# Patient Record
Sex: MALE | Race: BLACK OR AFRICAN AMERICAN | Employment: OTHER | URBAN - METROPOLITAN AREA
[De-identification: names, ages, dates, MRNs, and addresses within clinical notes are randomized per-mention and may not be internally consistent; named-entity substitution may affect disease eponyms.]

---

## 2017-02-16 ENCOUNTER — HOSPITAL ENCOUNTER (EMERGENCY)
Age: 67
Discharge: HOME | End: 2017-02-16
Attending: EMERGENCY MEDICINE
Payer: MEDICARE

## 2017-02-16 VITALS
WEIGHT: 147 LBS | OXYGEN SATURATION: 95 % | SYSTOLIC BLOOD PRESSURE: 195 MMHG | HEIGHT: 69 IN | RESPIRATION RATE: 16 BRPM | HEART RATE: 76 BPM | TEMPERATURE: 98.3 F | DIASTOLIC BLOOD PRESSURE: 87 MMHG | BODY MASS INDEX: 21.77 KG/M2

## 2017-02-16 DIAGNOSIS — I10 UNCONTROLLED HYPERTENSION: Primary | ICD-10-CM

## 2017-02-16 LAB
ANION GAP BLD CALC-SCNC: 10 MMOL/L (ref 7–16)
BUN SERPL-MCNC: 11 MG/DL (ref 8–23)
CALCIUM SERPL-MCNC: 9.2 MG/DL (ref 8.3–10.4)
CHLORIDE SERPL-SCNC: 104 MMOL/L (ref 98–107)
CO2 SERPL-SCNC: 30 MMOL/L (ref 21–32)
CREAT SERPL-MCNC: 1.81 MG/DL (ref 0.8–1.5)
GLUCOSE SERPL-MCNC: 83 MG/DL (ref 65–100)
POTASSIUM SERPL-SCNC: 3.8 MMOL/L (ref 3.5–5.1)
SODIUM SERPL-SCNC: 144 MMOL/L (ref 136–145)

## 2017-02-16 PROCEDURE — 96375 TX/PRO/DX INJ NEW DRUG ADDON: CPT | Performed by: EMERGENCY MEDICINE

## 2017-02-16 PROCEDURE — 74011250636 HC RX REV CODE- 250/636: Performed by: EMERGENCY MEDICINE

## 2017-02-16 PROCEDURE — 74011250637 HC RX REV CODE- 250/637: Performed by: EMERGENCY MEDICINE

## 2017-02-16 PROCEDURE — 99284 EMERGENCY DEPT VISIT MOD MDM: CPT | Performed by: EMERGENCY MEDICINE

## 2017-02-16 PROCEDURE — 74011000250 HC RX REV CODE- 250: Performed by: EMERGENCY MEDICINE

## 2017-02-16 PROCEDURE — 80048 BASIC METABOLIC PNL TOTAL CA: CPT | Performed by: EMERGENCY MEDICINE

## 2017-02-16 PROCEDURE — 96374 THER/PROPH/DIAG INJ IV PUSH: CPT | Performed by: EMERGENCY MEDICINE

## 2017-02-16 RX ORDER — HYDROCHLOROTHIAZIDE 25 MG/1
25 TABLET ORAL DAILY
Qty: 30 TAB | Refills: 0 | Status: SHIPPED | OUTPATIENT
Start: 2017-02-16 | End: 2017-02-22 | Stop reason: SDUPTHER

## 2017-02-16 RX ORDER — AMLODIPINE BESYLATE 10 MG/1
10 TABLET ORAL DAILY
Qty: 30 TAB | Refills: 0 | Status: SHIPPED | OUTPATIENT
Start: 2017-02-16 | End: 2017-02-22 | Stop reason: SDUPTHER

## 2017-02-16 RX ORDER — AMLODIPINE BESYLATE 10 MG/1
10 TABLET ORAL
Status: COMPLETED | OUTPATIENT
Start: 2017-02-16 | End: 2017-02-16

## 2017-02-16 RX ORDER — LABETALOL HYDROCHLORIDE 5 MG/ML
20 INJECTION, SOLUTION INTRAVENOUS ONCE
Status: COMPLETED | OUTPATIENT
Start: 2017-02-16 | End: 2017-02-16

## 2017-02-16 RX ORDER — HYDRALAZINE HYDROCHLORIDE 20 MG/ML
20 INJECTION INTRAMUSCULAR; INTRAVENOUS
Status: COMPLETED | OUTPATIENT
Start: 2017-02-16 | End: 2017-02-16

## 2017-02-16 RX ORDER — CARVEDILOL 25 MG/1
25 TABLET ORAL
Status: COMPLETED | OUTPATIENT
Start: 2017-02-16 | End: 2017-02-16

## 2017-02-16 RX ORDER — HYDROCHLOROTHIAZIDE 25 MG/1
25 TABLET ORAL
Status: COMPLETED | OUTPATIENT
Start: 2017-02-16 | End: 2017-02-16

## 2017-02-16 RX ORDER — SODIUM CHLORIDE 0.9 % (FLUSH) 0.9 %
5-10 SYRINGE (ML) INJECTION AS NEEDED
Status: DISCONTINUED | OUTPATIENT
Start: 2017-02-16 | End: 2017-02-16 | Stop reason: HOSPADM

## 2017-02-16 RX ORDER — SODIUM CHLORIDE 0.9 % (FLUSH) 0.9 %
5-10 SYRINGE (ML) INJECTION EVERY 8 HOURS
Status: DISCONTINUED | OUTPATIENT
Start: 2017-02-16 | End: 2017-02-16 | Stop reason: HOSPADM

## 2017-02-16 RX ORDER — CARVEDILOL 25 MG/1
25 TABLET ORAL 2 TIMES DAILY WITH MEALS
Qty: 60 TAB | Refills: 0 | Status: SHIPPED | OUTPATIENT
Start: 2017-02-16 | End: 2017-02-22 | Stop reason: SDUPTHER

## 2017-02-16 RX ADMIN — CARVEDILOL 25 MG: 25 TABLET, FILM COATED ORAL at 15:48

## 2017-02-16 RX ADMIN — HYDRALAZINE HYDROCHLORIDE 20 MG: 20 INJECTION INTRAMUSCULAR; INTRAVENOUS at 14:17

## 2017-02-16 RX ADMIN — AMLODIPINE BESYLATE 10 MG: 10 TABLET ORAL at 15:08

## 2017-02-16 RX ADMIN — LABETALOL HYDROCHLORIDE 20 MG: 5 INJECTION, SOLUTION INTRAVENOUS at 16:42

## 2017-02-16 RX ADMIN — HYDROCHLOROTHIAZIDE 25 MG: 25 TABLET ORAL at 15:48

## 2017-02-16 NOTE — ED TRIAGE NOTES
Patient out of BP meds since November. Sent over from [address]. Patient with no complaints.

## 2017-02-16 NOTE — PROGRESS NOTES
Patient requesting assistance with PCP. Made patient an appt with Weston Vera NP at [facility] for Wednesday, February 22nd at 10am / patient to arrive at 9:30am.    Patient and MD aware of appt. Pamphlet with contact information for [facility] given to patient. Patient verbalized understanding of all information.

## 2017-02-16 NOTE — DISCHARGE INSTRUCTIONS
Learning About High Blood Pressure  What is high blood pressure? Blood pressure is a measure of how hard the blood pushes against the walls of your arteries. It's normal for blood pressure to go up and down throughout the day, but if it stays up, you have high blood pressure. Another name for high blood pressure is hypertension. Two numbers tell you your blood pressure. The first number is the systolic pressure. It shows how hard the blood pushes when your heart is pumping. The second number is the diastolic pressure. It shows how hard the blood pushes between heartbeats, when your heart is relaxed and filling with blood. A blood pressure of less than 120/80 (say \"120 over 80\") is ideal for an adult. High blood pressure is 140/90 or higher. You have high blood pressure if your top number is 140 or higher or your bottom number is 90 or higher, or both. Many people fall into the category in between, called prehypertension. People with prehypertension need to make lifestyle changes to bring their blood pressure down and help prevent or delay high blood pressure. What happens when you have high blood pressure? · Blood flows through your arteries with too much force. Over time, this damages the walls of your arteries. But you can't feel it. High blood pressure usually doesn't cause symptoms. · Fat and calcium start to build up in your arteries. This buildup is called plaque. Plaque makes your arteries narrower and stiffer. Blood can't flow through them as easily. · This lack of good blood flow starts to damage some of the organs in your body. This can lead to problems such as coronary artery disease and heart attack, heart failure, stroke, kidney failure, and eye damage. How can you prevent high blood pressure? · Stay at a healthy weight. · Try to limit how much sodium you eat to less than 2,300 milligrams (mg) a day.  If you limit your sodium to 1,500 mg a day, you can lower your blood pressure even more.  ¨ Buy foods that are labeled \"unsalted,\" \"sodium-free,\" or \"low-sodium. \" Foods labeled \"reduced-sodium\" and \"light sodium\" may still have too much sodium. ¨ Flavor your food with garlic, lemon juice, onion, vinegar, herbs, and spices instead of salt. Do not use soy sauce, steak sauce, onion salt, garlic salt, mustard, or ketchup on your food. ¨ Use less salt (or none) when recipes call for it. You can often use half the salt a recipe calls for without losing flavor. · Be physically active. Get at least 30 minutes of exercise on most days of the week. Walking is a good choice. You also may want to do other activities, such as running, swimming, cycling, or playing tennis or team sports. · Limit alcohol to 2 drinks a day for men and 1 drink a day for women. · Eat plenty of fruits, vegetables, and low-fat dairy products. Eat less saturated and total fats. How is high blood pressure treated? · Your doctor will suggest making lifestyle changes. For example, your doctor may ask you to eat healthy foods, quit smoking, lose extra weight, and be more active. · If lifestyle changes don't help enough or your blood pressure is very high, you will have to take medicine every day. Follow-up care is a key part of your treatment and safety. Be sure to make and go to all appointments, and call your doctor if you are having problems. It's also a good idea to know your test results and keep a list of the medicines you take. Where can you learn more? Go to http://teresa-abi.info/. Enter P501 in the search box to learn more about \"Learning About High Blood Pressure. \"  Current as of: March 23, 2016  Content Version: 11.1  © 4929-8597 [employer]. Care instructions adapted under license by [employer] (which disclaims liability or warranty for this information).  If you have questions about a medical condition or this instruction, always ask your healthcare professional. [employer] disclaims any warranty or liability for your use of this information.

## 2017-02-16 NOTE — ED PROVIDER NOTES
HPI Comments: Patient was referred to the return from ultrasound due to uncontrolled hypertension. The patient however is asymptomatic. He was also seen today earlier at vascular surgery office by Dr. Hernando Graham. He is recently changed insurance and has to change healthcare providers and due to the lack of primary care provider his prescriptions for his antihypertensive medications have run out. He states he's been out of his blood pressure medicine for about 2 months. Again he denies any symptoms such as headache vision changes chest pain or shortness of breath. Patient is a 73 y.o. male presenting with hypertension. The history is provided by the patient. Hypertension    This is a chronic problem. The problem has been gradually worsening. Pertinent negatives include no chest pain, no orthopnea, no palpitations, no PND, no anxiety, no confusion, no malaise/fatigue, no blurred vision, no headaches, no tinnitus, no neck pain, no peripheral edema, no dizziness, no shortness of breath, no nausea and no vomiting. There are no associated agents to hypertension. Past Medical History:   Diagnosis Date    Chronic kidney disease     CKD (chronic kidney disease)     Hyperlipidemia     Hypertension     PAD (peripheral artery disease) (HCC)     Renal artery stenosis (HCC)     Spinal stenosis     Stroke [facility])        Past Surgical History:   Procedure Laterality Date    Vascular surgery procedure unlist       stents to L femoral, aorto-femoral bypass         Family History:   Problem Relation Age of Onset    Stroke Father     Hypertension Father     Stroke Mother     Hypertension Mother     Cancer Brother      pancreatic       Social History     Social History    Marital status: SINGLE     Spouse name: N/A    Number of children: N/A    Years of education: N/A     Occupational History    Not on file.      Social History Main Topics    Smoking status: Current Every Day Smoker     Packs/day: 0.50    Smokeless tobacco: Never Used    Alcohol use No    Drug use: No    Sexual activity: Not on file     Other Topics Concern    Not on file     Social History Narrative         ALLERGIES: Review of patient's allergies indicates no known allergies. Review of Systems   Constitutional: Negative for malaise/fatigue. HENT: Negative for tinnitus. Eyes: Negative for blurred vision. Respiratory: Negative for shortness of breath. Cardiovascular: Negative for chest pain, palpitations, orthopnea and PND. Gastrointestinal: Negative for nausea and vomiting. Musculoskeletal: Negative for neck pain. Neurological: Negative for dizziness and headaches. Psychiatric/Behavioral: Negative for confusion. All other systems reviewed and are negative. Vitals:    02/16/17 1304   BP: (!) 244/108   Pulse: 66   Resp: 18   Temp: 98 °F (36.7 °C)   SpO2: 99%   Weight: 66.7 kg (147 lb)   Height: 5' 9\" (1.753 m)            Physical Exam   Constitutional: He is oriented to person, place, and time. He appears well-developed and well-nourished. No distress. HENT:   Head: Normocephalic and atraumatic. Mouth/Throat: No oropharyngeal exudate. Eyes: Conjunctivae and EOM are normal. Pupils are equal, round, and reactive to light. Neck: Normal range of motion. Neck supple. Cardiovascular: Normal rate, regular rhythm, normal heart sounds and intact distal pulses. Pulmonary/Chest: Effort normal and breath sounds normal.   Abdominal: Soft. Bowel sounds are normal.   Musculoskeletal: Normal range of motion. He exhibits no edema, tenderness or deformity. Neurological: He is alert and oriented to person, place, and time. Skin: Skin is warm and dry. Psychiatric: He has a normal mood and affect. His behavior is normal.   Nursing note and vitals reviewed. MDM  Number of Diagnoses or Management Options  Diagnosis management comments:  We will check patient's renal function with a BMP and place a saline lock and treat his hypertension with plans for discharge to arrange primary care follow-up and prescriptions for hypertension medications       Amount and/or Complexity of Data Reviewed  Clinical lab tests: ordered and reviewed    Risk of Complications, Morbidity, and/or Mortality  Presenting problems: moderate  Diagnostic procedures: moderate  Management options: moderate    Patient Progress  Patient progress: stable    ED Course       Procedures

## 2017-02-22 PROBLEM — I73.9 PAD (PERIPHERAL ARTERY DISEASE) (HCC): Status: ACTIVE | Noted: 2017-02-22

## 2017-02-22 PROBLEM — I70.1 RENAL ARTERY STENOSIS (HCC): Status: ACTIVE | Noted: 2017-02-22

## 2017-02-22 PROBLEM — N18.30 CKD (CHRONIC KIDNEY DISEASE) STAGE 3, GFR 30-59 ML/MIN (HCC): Status: ACTIVE | Noted: 2017-02-22

## 2017-02-22 PROBLEM — E78.5 HYPERLIPIDEMIA: Status: ACTIVE | Noted: 2017-02-22

## 2017-02-22 PROBLEM — Z72.0 TOBACCO ABUSE: Status: ACTIVE | Noted: 2017-02-22

## 2017-03-10 ENCOUNTER — HOSPITAL ENCOUNTER (OUTPATIENT)
Dept: GENERAL RADIOLOGY | Age: 67
Discharge: HOME | End: 2017-03-10
Payer: MEDICARE

## 2017-03-10 DIAGNOSIS — M25.552 PAIN OF LEFT HIP JOINT: ICD-10-CM

## 2017-03-10 DIAGNOSIS — M25.562 ACUTE PAIN OF LEFT KNEE: ICD-10-CM

## 2017-03-10 PROBLEM — N18.9 CHRONIC UREMIA: Status: ACTIVE | Noted: 2017-03-10

## 2017-03-10 PROBLEM — I10 HYPERTENSION: Status: ACTIVE | Noted: 2017-03-10

## 2017-03-10 PROBLEM — J30.9 ATOPIC RHINITIS: Status: ACTIVE | Noted: 2017-03-10

## 2017-03-10 PROBLEM — N18.9 CHRONIC UREMIA: Status: RESOLVED | Noted: 2017-03-10 | Resolved: 2017-03-10

## 2017-03-10 PROBLEM — M48.00 SPINAL STENOSIS: Status: ACTIVE | Noted: 2017-03-10

## 2017-03-10 PROCEDURE — 73560 X-RAY EXAM OF KNEE 1 OR 2: CPT

## 2017-03-10 PROCEDURE — 73502 X-RAY EXAM HIP UNI 2-3 VIEWS: CPT

## 2017-03-24 PROBLEM — N52.01 ERECTILE DYSFUNCTION DUE TO ARTERIAL INSUFFICIENCY: Status: ACTIVE | Noted: 2017-03-24

## 2017-04-21 PROBLEM — M25.562 LEFT KNEE PAIN: Status: ACTIVE | Noted: 2017-04-21

## 2017-04-21 PROBLEM — I25.10 CORONARY ARTERY DISEASE INVOLVING NATIVE CORONARY ARTERY OF NATIVE HEART WITHOUT ANGINA PECTORIS: Status: ACTIVE | Noted: 2017-04-21

## 2017-06-18 ENCOUNTER — HOSPITAL ENCOUNTER (EMERGENCY)
Age: 67
Discharge: HOME | DRG: 253 | End: 2017-06-18
Attending: EMERGENCY MEDICINE
Payer: MEDICARE

## 2017-06-18 ENCOUNTER — APPOINTMENT (OUTPATIENT)
Dept: CT IMAGING | Age: 67
DRG: 253 | End: 2017-06-18
Attending: EMERGENCY MEDICINE
Payer: MEDICARE

## 2017-06-18 VITALS
HEIGHT: 69 IN | RESPIRATION RATE: 18 BRPM | WEIGHT: 137 LBS | SYSTOLIC BLOOD PRESSURE: 195 MMHG | BODY MASS INDEX: 20.29 KG/M2 | HEART RATE: 67 BPM | DIASTOLIC BLOOD PRESSURE: 93 MMHG | OXYGEN SATURATION: 96 % | TEMPERATURE: 97.9 F

## 2017-06-18 DIAGNOSIS — I73.9 PERIPHERAL ARTERIAL DISEASE (HCC): Primary | ICD-10-CM

## 2017-06-18 DIAGNOSIS — M54.31 SCIATICA OF RIGHT SIDE: ICD-10-CM

## 2017-06-18 LAB
ANION GAP BLD CALC-SCNC: 7 MMOL/L (ref 7–16)
BASOPHILS # BLD AUTO: 0.1 K/UL (ref 0–0.2)
BASOPHILS # BLD: 1 % (ref 0–2)
BUN SERPL-MCNC: 10 MG/DL (ref 8–23)
CALCIUM SERPL-MCNC: 8.6 MG/DL (ref 8.3–10.4)
CHLORIDE SERPL-SCNC: 103 MMOL/L (ref 98–107)
CO2 SERPL-SCNC: 31 MMOL/L (ref 21–32)
CREAT SERPL-MCNC: 1.54 MG/DL (ref 0.8–1.5)
DIFFERENTIAL METHOD BLD: ABNORMAL
EOSINOPHIL # BLD: 0.3 K/UL (ref 0–0.8)
EOSINOPHIL NFR BLD: 4 % (ref 0.5–7.8)
ERYTHROCYTE [DISTWIDTH] IN BLOOD BY AUTOMATED COUNT: 13.8 % (ref 11.9–14.6)
GLUCOSE SERPL-MCNC: 88 MG/DL (ref 65–100)
HCT VFR BLD AUTO: 44.9 % (ref 41.1–50.3)
HGB BLD-MCNC: 15.1 G/DL (ref 13.6–17.2)
IMM GRANULOCYTES # BLD: 0 K/UL (ref 0–0.5)
IMM GRANULOCYTES NFR BLD AUTO: 0.2 % (ref 0–5)
INR PPP: 1 (ref 0.9–1.2)
LYMPHOCYTES # BLD AUTO: 22 % (ref 13–44)
LYMPHOCYTES # BLD: 1.5 K/UL (ref 0.5–4.6)
MAGNESIUM SERPL-MCNC: 2.1 MG/DL (ref 1.8–2.4)
MCH RBC QN AUTO: 30.3 PG (ref 26.1–32.9)
MCHC RBC AUTO-ENTMCNC: 33.6 G/DL (ref 31.4–35)
MCV RBC AUTO: 90 FL (ref 79.6–97.8)
MONOCYTES # BLD: 0.7 K/UL (ref 0.1–1.3)
MONOCYTES NFR BLD AUTO: 11 % (ref 4–12)
NEUTS SEG # BLD: 4.1 K/UL (ref 1.7–8.2)
NEUTS SEG NFR BLD AUTO: 62 % (ref 43–78)
PLATELET # BLD AUTO: 222 K/UL (ref 150–450)
PMV BLD AUTO: 9.9 FL (ref 10.8–14.1)
POTASSIUM SERPL-SCNC: 3.5 MMOL/L (ref 3.5–5.1)
PROTHROMBIN TIME: 10.9 SEC (ref 9.6–12)
RBC # BLD AUTO: 4.99 M/UL (ref 4.23–5.67)
SODIUM SERPL-SCNC: 141 MMOL/L (ref 136–145)
WBC # BLD AUTO: 6.7 K/UL (ref 4.3–11.1)

## 2017-06-18 RX ORDER — HYDROCODONE BITARTRATE AND ACETAMINOPHEN 5; 325 MG/1; MG/1
1-2 TABLET ORAL
Qty: 30 TAB | Refills: 0 | Status: SHIPPED | OUTPATIENT
Start: 2017-06-18 | End: 2017-07-25

## 2017-06-18 RX ORDER — SODIUM CHLORIDE 0.9 % (FLUSH) 0.9 %
10 SYRINGE (ML) INJECTION
Status: COMPLETED | OUTPATIENT
Start: 2017-06-18 | End: 2017-06-18

## 2017-06-18 RX ADMIN — SODIUM CHLORIDE 1000 ML: 900 INJECTION, SOLUTION INTRAVENOUS at 10:13

## 2017-06-18 RX ADMIN — SODIUM CHLORIDE 100 ML: 900 INJECTION, SOLUTION INTRAVENOUS at 11:30

## 2017-06-18 RX ADMIN — IOPAMIDOL 125 ML: 755 INJECTION, SOLUTION INTRAVENOUS at 11:30

## 2017-06-18 RX ADMIN — Medication 10 ML: at 11:30

## 2017-06-18 NOTE — CONSULTS
[address] FAX: [phone]    Laci Suero  1950    Chief Complaint   Patient presents with    Back Pain           HPI   Mr. Laci Suero is a 78y.o. year old male who presents to the emergency department with  right lower hip and leg pain over the last week. Patient has a history of an aortobifem procedure done in [place] 20 years ago. Patient has history of tobacco abuse and hypertension. Patient said he presented to [facility] [place] with similar symptoms last year, left leg pain in which they did embolectomy of his occluded left limb. Current Outpatient Prescriptions   Medication Sig Dispense Refill    HYDROcodone-acetaminophen (NORCO) 5-325 mg per tablet Take 1-2 Tabs by mouth every eight (8) hours as needed for Pain. Max Daily Amount: 6 Tabs. 30 Tab 0    traMADol (ULTRAM) 50 mg tablet 1 tab p.o. twice daily as needed pain. Indications: Pain 40 Tab 0    sildenafil citrate (VIAGRA) 50 mg tablet Take 1 Tab by mouth as needed. Indications: Erectile Dysfunction 6 Tab 2    atorvastatin (LIPITOR) 20 mg tablet Take 1 Tab by mouth daily. 30 Tab 5    hydrALAZINE (APRESOLINE) 10 mg tablet Take 1 Tab by mouth three (3) times daily. 90 Tab 5    amLODIPine (NORVASC) 10 mg tablet Take 1 Tab by mouth daily. 30 Tab 5    carvedilol (COREG) 25 mg tablet Take 1 Tab by mouth two (2) times daily (with meals). 60 Tab 5    hydroCHLOROthiazide (HYDRODIURIL) 25 mg tablet Take 1 Tab by mouth daily. 30 Tab 5    aspirin delayed-release 81 mg tablet Take 81 mg by mouth daily.        No Known Allergies  Past Medical History:   Diagnosis Date    Chronic kidney disease     CKD (chronic kidney disease)     Hyperlipidemia     Hypertension     PAD (peripheral artery disease) (HCC)     Renal artery stenosis (HCC)     Spinal stenosis     Stroke ([facility] [address]      Family History   Problem Relation Age of Onset    Stroke Father    William Mina Hypertension Father     Stroke Mother     Hypertension Mother     Cancer Brother      pancreatic     Past Surgical History:   Procedure Laterality Date    VASCULAR SURGERY PROCEDURE UNLIST      stents to L femoral, aorto-femoral bypass     Social History   Substance Use Topics    Smoking status: Current Some Day Smoker     Packs/day: 0.25     Types: Cigarettes    Smokeless tobacco: Never Used    Alcohol use No        Review of Systems  Constitutional: Negative for fever and chills. HENT: Negative for congestion and sore throat. Skin: Negative for rash and itching. Eyes: Negative for blurred vision and double vision. Respiratory: Negative for cough and shortness of breath. Cardiovascular: Negative for chest pain, palpitations, claudication and leg swelling. Gastrointestinal: Negative for nausea, vomiting and abdominal pain. Genitourinary: Negative for dysuria, hematuria and flank pain. Musculoskeletal: Negative for joint pain and falls. Neurological: Negative for dizziness, sensory change, focal weakness, loss of consciousness and headaches. PHYSICAL EXAM     Vitals:    06/18/17 1211 06/18/17 1216 06/18/17 1223 06/18/17 1229   BP:       BP 1 Location:       BP Patient Position:       Pulse: 72 73 67    Resp:       Temp:       SpO2: 97% 97% 95% 96%   Weight:       Height:            Constitutional: he appears well-developed. No distress. HENT: Hearing intact. Head: Atraumatic. Eyes: Pupils are equal, round, and reactive to light. Neck: Normal range of motion. Cardiovascular: Regular rhythm. Pulmonary/Chest: Effort normal and breath sounds normal. No respiratory distress. Abdominal: Soft. Bowel sounds are normal. he exhibits no distension. There is no tenderness. There is no guarding. No hernia. Musculoskeletal: Normal range of motion. Neurological: He is alert. CN II- XII grossly intact  Skin: Warm and dry.   Vascular: No palpable femoral pulse, palpable left femoral pulse, lower extremity warm motor and sensory intact      Imaging Results  I reviewed CTA images which showed an occluded right aortofemoral limb, reconstitutes of the profunda and SFA artery with diffuse tibial disease on the right, left limb patent with an occluded left SFA with profunda patent on the left    ASSESSMENT AND PLAN   Mr. Shawn Jacobsen is a 78y.o. 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 Patient clinically is not ischemic with motor and sensory intact. Would recommend admitting patient to the hospital with IV heparin and plan for embolectomy possible femorofemoral bypass. Patient preference is to go home and to schedule electively this week. I would rather admit the patient however patient is not ischemic. Have electively schedule patient for surgery on Tuesday he will be admitted to the hospital tomorrow for further workup and placed on heparin and heparin drip until surgery on on Tuesday. I continue to stress the importance of tobacco abuse to his already peripheral vascular disease. Elements of this note have been dictated using speech recognition software. As a result, errors of speech recognition may have occurred.

## 2017-06-18 NOTE — ED TRIAGE NOTES
Pt reports right sided lower back pain that goes down his right leg into his foot that started a week ago. Pt states he just took his blood pressure meds before he came in this morning.

## 2017-06-18 NOTE — ED PROVIDER NOTES
HPI Comments: Patient with history of peripheral vascular disease and aorta left femoral bypass. Presents with 1 week of low back pain radiating down the right leg. Pain is present regardless of whether he is sitting or standing. Pain is worse with walking exertion/better with rest, remaining still. No history of sciatica, patient states that the pain down his right leg is reminiscent of when he wound up getting the bypass done to the left leg. Patient relates the time of the bypass which was some years ago in [place] the right femoral pulse was bounding but they couldn't feel it on the left. Patient states he recently he can't feel the pounding over the right femoral pulse. Patient is a 78 y.o. male presenting with back pain. The history is provided by the patient. Back Pain    This is a new problem. The current episode started more than 1 week ago. The problem has not changed since onset. The problem occurs constantly. Patient reports not work related injury. The pain is associated with no known injury. Pertinent negatives include no chest pain, no fever, no headaches and no abdominal pain. Past Medical History:   Diagnosis Date    Chronic kidney disease     CKD (chronic kidney disease)     Hyperlipidemia     Hypertension     PAD (peripheral artery disease) (HCC)     Renal artery stenosis (HCC)     Spinal stenosis     Stroke [facility])        Past Surgical History:   Procedure Laterality Date    VASCULAR SURGERY PROCEDURE UNLIST      stents to L femoral, aorto-femoral bypass         Family History:   Problem Relation Age of Onset    Stroke Father     Hypertension Father     Stroke Mother     Hypertension Mother     Cancer Brother      pancreatic       Social History     Social History    Marital status: SINGLE     Spouse name: N/A    Number of children: N/A    Years of education: N/A     Occupational History    Not on file.      Social History Main Topics    Smoking status: Current Some Day Smoker     Packs/day: 0.25     Types: Cigarettes    Smokeless tobacco: Never Used    Alcohol use No    Drug use: No    Sexual activity: Yes     Partners: Female     Birth control/ protection: Condom     Other Topics Concern    Not on file     Social History Narrative         ALLERGIES: Review of patient's allergies indicates no known allergies. Review of Systems   Constitutional: Negative for chills and fever. HENT: Negative for rhinorrhea and sore throat. Eyes: Negative for discharge and redness. Respiratory: Negative for cough and shortness of breath. Cardiovascular: Negative for chest pain and palpitations. Gastrointestinal: Negative for abdominal pain, nausea and vomiting. Musculoskeletal: Positive for back pain, gait problem and myalgias. Negative for arthralgias. Skin: Negative for rash. Neurological: Negative for dizziness and headaches. All other systems reviewed and are negative. Vitals:    06/18/17 0652 06/18/17 0933 06/18/17 0943 06/18/17 0950   BP: (!) 202/102 184/84     Pulse: 76 72 69 67   Resp: 18      Temp: 97.8 °F (36.6 °C)      SpO2: 100% 98% 97% 97%   Weight: 62.1 kg (137 lb)      Height: 5' 9\" (1.753 m)               Physical Exam   Constitutional: He is oriented to person, place, and time. He appears well-developed and well-nourished. HENT:   Head: Normocephalic and atraumatic. Eyes: Conjunctivae are normal. Pupils are equal, round, and reactive to light. Right eye exhibits no discharge. Left eye exhibits no discharge. No scleral icterus. Neck: Normal range of motion. Neck supple. Cardiovascular: Normal rate, regular rhythm and normal heart sounds. Exam reveals no gallop. No murmur heard. Pulses:       Dorsalis pedis pulses are 0 on the right side, and 2+ on the left side. Posterior tibial pulses are 0 on the right side, and 2+ on the left side.    Right dorsalis pedis pulse is not even detectable by Doppler,    Right posterior tibial pulse is barely detectable by Doppler and has a smoother flatter sine wave consistency  Rather than a sharp, brisk arterial signal.    Unable to obtain a blood pressure even by manual cuff on the right lower extremity to permit SITA comparisons   Pulmonary/Chest: Effort normal and breath sounds normal. No respiratory distress. He has no wheezes. He has no rales. Abdominal: Soft. Bowel sounds are normal. There is no tenderness. There is no guarding. Musculoskeletal: Normal range of motion. He exhibits no edema. Neurological: He is alert and oriented to person, place, and time. He exhibits normal muscle tone. cni 2-12 grossly   Skin: Skin is warm and dry. He is not diaphoretic. Psychiatric: He has a normal mood and affect. His behavior is normal.   Nursing note and vitals reviewed. MDM  Number of Diagnoses or Management Options  Peripheral arterial disease ([address]):   Sciatica of right side:   Diagnosis management comments: Medical decision making note:  Right leg pain with intermittent claudication, status post aortofemoral bypass on the left side roughly September 2016  Now presents with right leg pain and CT scan reveals Iliac occlusion on the right side  Discussed with Dr. Rashawn Hill from vascular surgery who will come and evaluate the patient after reviewing the CT scan. Patient seen by Dr. Rashawn Hill in ER based on exam, history, CT scan he feels this iliac occlusion is chronic in nature. Admittedly the right foot is just is warm as the left, he does have a weakly dopplerable posterior tibial pulse on that side. Ora Rojas recommended admission today and surgery in a couple days, patient wanted to go home instead. Dr. Deboraha Halsted spoke to patient and will see him in the office to make arrangements for surgery soon. This concludes the \"medical decision making note\" part of this emergency department visit note.       ED Course       Procedures

## 2017-06-18 NOTE — DISCHARGE INSTRUCTIONS
Quit smoking         Learning About Aortobifemoral Bypass Surgery for Peripheral Arterial Disease  What is an aortobifemoral bypass? An aortobifemoral (say \"ev-EI-ulu-by-FEM--denise\") bypass is surgery to redirect blood flow around blocked blood vessels in your belly or pelvis. These blocked blood vessels are caused by peripheral arterial disease. The surgery is done to get more blood flow to the legs. This may allow you to walk farther. Your doctor will use a man-made blood vessel, called a graft, to bypass the blocked blood vessels. The graft will carry blood from the aorta to the femoral artery in the groin area of each thigh. The aorta is the large blood vessel that carries blood from the heart to the blood vessels in the belly. The femoral arteries are large blood vessels that carry blood from the blood vessels in the belly and pelvis to the legs. How is the surgery done? You will be asleep during the surgery. The doctor will make cuts (incisions) in your belly and in the groin area of each thigh. The doctor will connect the graft to the aorta through the cut in the belly. He or she will then tunnel the graft down to the cuts in your groin. This connects the bypass to your femoral arteries. When the graft is in place, the doctor will close the cuts in your skin with stitches or staples. What can you expect after this surgery? You will probably spend 4 to 7 days in the hospital. Your belly and groin will be sore for several weeks. You will probably feel more tired than usual for several weeks. You may be able to do many of your usual activities after 4 to 6 weeks. But you will likely need 2 to 3 months to fully recover. You will probably need to take at least 4 to 6 weeks off from work. It depends on the type of work you do and how you feel. Follow-up care is a key part of your treatment and safety. Be sure to make and go to all appointments, and call your doctor if you are having problems.  It's also a good idea to know your test results and keep a list of the medicines you take. Where can you learn more? Go to http://teresa-abi.info/. Enter 24 [id number] in the search box to learn more about \"Learning About Aortobifemoral Bypass Surgery for Peripheral Arterial Disease. \"  Current as of: January 27, 2016  Content Version: 11.2  © 8707-6483 "[employer]. Care instructions adapted under license by [employer] (which disclaims liability or warranty for this information). If you have questions about a medical condition or this instruction, always ask your healthcare professional. [place] [address] any warranty or liability for your use of this information. Learning About How to Have a Healthy Back  What causes back pain? Back pain is often caused by overuse, strain, or injury. For example, people often hurt their backs playing sports or working in the yard, being jolted in a car accident, or lifting something too heavy. Aging plays a part too. Your bones and muscles tend to lose strength as you age, which makes injury more likely. The spongy discs between the bones of the spine (vertebrae) may suffer from wear and tear and no longer provide enough cushion between the bones. A disc that bulges or breaks open (herniated disc) can press on nerves, causing back pain. In some people, back pain is the result of arthritis, broken vertebrae caused by bone loss (osteoporosis), illness, or a spine problem. Although most people have back pain at one time or another, there are steps you can take to make it less likely. How can you have a healthy back? Reduce stress on your back through good posture  Slumping or slouching alone may not cause low back pain. But after the back has been strained or injured, bad posture can make pain worse. · Sleep in a position that maintains your back's normal curves and on a mattress that feels comfortable.  Sleep on your side with a pillow between your knees, or sleep on your back with a pillow under your knees. These positions can reduce strain on your back. · Stand and sit up straight. \"Good posture\" generally means your ears, shoulders, and hips are in a straight line. · If you must stand for a long time, put one foot on a stool, ledge, or box. Switch feet every now and then. · Sit in a chair that is low enough to let you place both feet flat on the floor with both knees nearly level with your hips. If your chair or desk is too high, use a footrest to raise your knees. Place a small pillow, a rolled-up towel, or a lumbar roll in the curve of your back if you need extra support. · Try a kneeling chair, which helps tilt your hips forward. This takes pressure off your lower back. · Try sitting on an exercise ball. It can rock from side to side, which helps keep your back loose. · When driving, keep your knees nearly level with your hips. Sit straight, and drive with both hands on the steering wheel. Your arms should be in a slightly bent position. Reduce stress on your back through careful lifting  · Squat down, bending at the hips and knees only. If you need to, put one knee to the floor and extend your other knee in front of you, bent at a right angle (half kneeling). · Press your chest straight forward. This helps keep your upper back straight while keeping a slight arch in your low back. · Hold the load as close to your body as possible, at the level of your belly button (navel). · Use your feet to change direction, taking small steps. · Lead with your hips as you change direction. Keep your shoulders in line with your hips as you move. · Set down your load carefully, squatting with your knees and hips only. Exercise and stretch your back  · Do some exercise on most days of the week, if your doctor says it is okay. You can walk, run, swim, or cycle. · Stretch your back muscles.  Here are a few exercises to try:  Aleyda Pastures on your back, and gently pull one bent knee to your chest. Put that foot back on the floor, and then pull the other knee to your chest.  ¨ Do pelvic tilts. Lie on your back with your knees bent. Tighten your stomach muscles. Pull your belly button (navel) in and up toward your ribs. You should feel like your back is pressing to the floor and your hips and pelvis are slightly lifting off the floor. Hold for 6 seconds while breathing smoothly. ¨ Sit with your back flat against a wall. · Keep your core muscles strong. The muscles of your back, belly (abdomen), and buttocks support your spine. ¨ Pull in your belly and imagine pulling your navel toward your spine. Hold this for 6 seconds, then relax. Remember to keep breathing normally as you tense your muscles. ¨ Do curl-ups. Always do them with your knees bent. Keep your low back on the floor, and curl your shoulders toward your knees using a smooth, slow motion. Keep your arms folded across your chest. If this bothers your neck, try putting your hands behind your neck (not your head), with your elbows spread apart. ¨ Lie on your back with your knees bent and your feet flat on the floor. Tighten your belly muscles, and then push with your feet and raise your buttocks up a few inches. Hold this position 6 seconds as you continue to breathe normally, then lower yourself slowly to the floor. Repeat 8 to 12 times. ¨ If you like group exercise, try Pilates or yoga. These classes have poses that strengthen the core muscles. Lead a healthy lifestyle  · Stay at a healthy weight to avoid strain on your back. · Do not smoke. Smoking increases the risk of osteoporosis, which weakens the spine. If you need help quitting, talk to your doctor about stop-smoking programs and medicines. These can increase your chances of quitting for good. Where can you learn more? Go to http://teresa-abi.info/.   Enter L315 in the search box to learn more about \"Learning About How to Have a Healthy Back. \"  Current as of: May 23, 2016  Content Version: 11.2  © 6261-1140 [employer]. Care instructions adapted under license by [employer] (which disclaims liability or warranty for this information). If you have questions about a medical condition or this instruction, always ask your healthcare professional. [address] any warranty or liability for your use of this information.

## 2017-06-18 NOTE — ED NOTES
I have reviewed discharge instructions with the patient. The patient verbalized understanding. Prescriptions given times one. Dr. Liliam Trejo was also at pt bedside while RN was giving discharge instructions. Pt is aware of follow up information.

## 2017-06-18 NOTE — ED NOTES
RN was at pt bedside with portable doppler doing SITA. Unsuccessful attempt to right dorsalis pulse. Dr. Gideon Harper is aware. IV has been placed and blood work has been drawn.

## 2017-06-19 ENCOUNTER — ANESTHESIA EVENT (OUTPATIENT)
Dept: SURGERY | Age: 67
DRG: 253 | End: 2017-06-19
Payer: MEDICARE

## 2017-06-19 ENCOUNTER — HOSPITAL ENCOUNTER (INPATIENT)
Age: 67
LOS: 2 days | Discharge: HOME | DRG: 253 | End: 2017-06-21
Attending: SURGERY | Admitting: SURGERY
Payer: MEDICARE

## 2017-06-19 PROBLEM — I74.5 ILIAC ARTERY OCCLUSION, RIGHT (HCC): Status: ACTIVE | Noted: 2017-06-19

## 2017-06-19 LAB
ABO + RH BLD: NORMAL
ANION GAP BLD CALC-SCNC: 10 MMOL/L (ref 7–16)
APTT PPP: 50.1 SEC (ref 23.5–31.7)
ATRIAL RATE: 65 BPM
BLOOD GROUP ANTIBODIES SERPL: NORMAL
BUN SERPL-MCNC: 12 MG/DL (ref 8–23)
CALCIUM SERPL-MCNC: 8.4 MG/DL (ref 8.3–10.4)
CALCULATED P AXIS, ECG09: 64 DEGREES
CALCULATED R AXIS, ECG10: 72 DEGREES
CALCULATED T AXIS, ECG11: 71 DEGREES
CHLORIDE SERPL-SCNC: 104 MMOL/L (ref 98–107)
CO2 SERPL-SCNC: 30 MMOL/L (ref 21–32)
CREAT SERPL-MCNC: 1.84 MG/DL (ref 0.8–1.5)
DIAGNOSIS, 93000: NORMAL
ERYTHROCYTE [DISTWIDTH] IN BLOOD BY AUTOMATED COUNT: 13.9 % (ref 11.9–14.6)
GLUCOSE SERPL-MCNC: 89 MG/DL (ref 65–100)
HCT VFR BLD AUTO: 41.7 % (ref 41.1–50.3)
HGB BLD-MCNC: 13.9 G/DL (ref 13.6–17.2)
MCH RBC QN AUTO: 30.1 PG (ref 26.1–32.9)
MCHC RBC AUTO-ENTMCNC: 33.3 G/DL (ref 31.4–35)
MCV RBC AUTO: 90.3 FL (ref 79.6–97.8)
P-R INTERVAL, ECG05: 228 MS
PLATELET # BLD AUTO: 242 K/UL (ref 150–450)
PMV BLD AUTO: 10.1 FL (ref 10.8–14.1)
POTASSIUM SERPL-SCNC: 3.3 MMOL/L (ref 3.5–5.1)
Q-T INTERVAL, ECG07: 436 MS
QRS DURATION, ECG06: 96 MS
QTC CALCULATION (BEZET), ECG08: 453 MS
RBC # BLD AUTO: 4.62 M/UL (ref 4.23–5.67)
SODIUM SERPL-SCNC: 144 MMOL/L (ref 136–145)
SPECIMEN EXP DATE BLD: NORMAL
VENTRICULAR RATE, ECG03: 65 BPM
WBC # BLD AUTO: 6.4 K/UL (ref 4.3–11.1)

## 2017-06-19 PROCEDURE — 93005 ELECTROCARDIOGRAM TRACING: CPT | Performed by: NURSE PRACTITIONER

## 2017-06-19 PROCEDURE — 65270000029 HC RM PRIVATE

## 2017-06-19 PROCEDURE — 74011250637 HC RX REV CODE- 250/637: Performed by: NURSE PRACTITIONER

## 2017-06-19 PROCEDURE — 74011250636 HC RX REV CODE- 250/636: Performed by: SURGERY

## 2017-06-19 PROCEDURE — 85027 COMPLETE CBC AUTOMATED: CPT | Performed by: NURSE PRACTITIONER

## 2017-06-19 PROCEDURE — 80048 BASIC METABOLIC PNL TOTAL CA: CPT | Performed by: NURSE PRACTITIONER

## 2017-06-19 PROCEDURE — 85730 THROMBOPLASTIN TIME PARTIAL: CPT | Performed by: SURGERY

## 2017-06-19 PROCEDURE — 36415 COLL VENOUS BLD VENIPUNCTURE: CPT | Performed by: NURSE PRACTITIONER

## 2017-06-19 PROCEDURE — 86900 BLOOD TYPING SEROLOGIC ABO: CPT | Performed by: NURSE PRACTITIONER

## 2017-06-19 PROCEDURE — 74011250636 HC RX REV CODE- 250/636: Performed by: NURSE PRACTITIONER

## 2017-06-19 RX ORDER — ACETAMINOPHEN 325 MG/1
650 TABLET ORAL
Status: DISCONTINUED | OUTPATIENT
Start: 2017-06-19 | End: 2017-06-21 | Stop reason: HOSPADM

## 2017-06-19 RX ORDER — ADHESIVE BANDAGE
30 BANDAGE TOPICAL DAILY PRN
Status: DISCONTINUED | OUTPATIENT
Start: 2017-06-19 | End: 2017-06-21 | Stop reason: HOSPADM

## 2017-06-19 RX ORDER — SODIUM CHLORIDE 9 MG/ML
100 INJECTION, SOLUTION INTRAVENOUS ONCE
Status: COMPLETED | OUTPATIENT
Start: 2017-06-20 | End: 2017-06-20

## 2017-06-19 RX ORDER — MORPHINE SULFATE 2 MG/ML
2 INJECTION, SOLUTION INTRAMUSCULAR; INTRAVENOUS
Status: DISCONTINUED | OUTPATIENT
Start: 2017-06-19 | End: 2017-06-21 | Stop reason: HOSPADM

## 2017-06-19 RX ORDER — HYDRALAZINE HYDROCHLORIDE 10 MG/1
10 TABLET, FILM COATED ORAL 3 TIMES DAILY
Status: DISCONTINUED | OUTPATIENT
Start: 2017-06-19 | End: 2017-06-21 | Stop reason: HOSPADM

## 2017-06-19 RX ORDER — SODIUM CHLORIDE 0.9 % (FLUSH) 0.9 %
5-10 SYRINGE (ML) INJECTION EVERY 8 HOURS
Status: DISCONTINUED | OUTPATIENT
Start: 2017-06-19 | End: 2017-06-21 | Stop reason: HOSPADM

## 2017-06-19 RX ORDER — ONDANSETRON 2 MG/ML
4 INJECTION INTRAMUSCULAR; INTRAVENOUS
Status: DISCONTINUED | OUTPATIENT
Start: 2017-06-19 | End: 2017-06-21 | Stop reason: HOSPADM

## 2017-06-19 RX ORDER — ATORVASTATIN CALCIUM 10 MG/1
20 TABLET, FILM COATED ORAL DAILY
Status: DISCONTINUED | OUTPATIENT
Start: 2017-06-19 | End: 2017-06-21 | Stop reason: HOSPADM

## 2017-06-19 RX ORDER — TRAMADOL HYDROCHLORIDE 50 MG/1
50 TABLET ORAL
Status: DISCONTINUED | OUTPATIENT
Start: 2017-06-19 | End: 2017-06-21 | Stop reason: HOSPADM

## 2017-06-19 RX ORDER — ASPIRIN 81 MG/1
81 TABLET ORAL DAILY
Status: DISCONTINUED | OUTPATIENT
Start: 2017-06-19 | End: 2017-06-21 | Stop reason: HOSPADM

## 2017-06-19 RX ORDER — AMLODIPINE BESYLATE 10 MG/1
10 TABLET ORAL DAILY
Status: DISCONTINUED | OUTPATIENT
Start: 2017-06-19 | End: 2017-06-21 | Stop reason: HOSPADM

## 2017-06-19 RX ORDER — DIPHENHYDRAMINE HCL 25 MG
25 CAPSULE ORAL
Status: DISCONTINUED | OUTPATIENT
Start: 2017-06-19 | End: 2017-06-21 | Stop reason: HOSPADM

## 2017-06-19 RX ORDER — NALOXONE HYDROCHLORIDE 0.4 MG/ML
0.4 INJECTION, SOLUTION INTRAMUSCULAR; INTRAVENOUS; SUBCUTANEOUS AS NEEDED
Status: DISCONTINUED | OUTPATIENT
Start: 2017-06-19 | End: 2017-06-21 | Stop reason: HOSPADM

## 2017-06-19 RX ORDER — HYDROCHLOROTHIAZIDE 25 MG/1
25 TABLET ORAL DAILY
Status: DISCONTINUED | OUTPATIENT
Start: 2017-06-19 | End: 2017-06-21 | Stop reason: HOSPADM

## 2017-06-19 RX ORDER — HEPARIN SODIUM 5000 [USP'U]/100ML
18-36 INJECTION, SOLUTION INTRAVENOUS
Status: DISCONTINUED | OUTPATIENT
Start: 2017-06-19 | End: 2017-06-21

## 2017-06-19 RX ORDER — CEFAZOLIN SODIUM IN 0.9 % NACL 2 G/50 ML
2 INTRAVENOUS SOLUTION, PIGGYBACK (ML) INTRAVENOUS
Status: COMPLETED | OUTPATIENT
Start: 2017-06-20 | End: 2017-06-20

## 2017-06-19 RX ORDER — HYDROCODONE BITARTRATE AND ACETAMINOPHEN 5; 325 MG/1; MG/1
1 TABLET ORAL
Status: DISCONTINUED | OUTPATIENT
Start: 2017-06-19 | End: 2017-06-21 | Stop reason: HOSPADM

## 2017-06-19 RX ORDER — SODIUM CHLORIDE 0.9 % (FLUSH) 0.9 %
5-10 SYRINGE (ML) INJECTION AS NEEDED
Status: DISCONTINUED | OUTPATIENT
Start: 2017-06-19 | End: 2017-06-21 | Stop reason: HOSPADM

## 2017-06-19 RX ORDER — CARVEDILOL 25 MG/1
25 TABLET ORAL 2 TIMES DAILY WITH MEALS
Status: DISCONTINUED | OUTPATIENT
Start: 2017-06-19 | End: 2017-06-21 | Stop reason: HOSPADM

## 2017-06-19 RX ORDER — HEPARIN SODIUM 5000 [USP'U]/ML
35 INJECTION, SOLUTION INTRAVENOUS; SUBCUTANEOUS ONCE
Status: COMPLETED | OUTPATIENT
Start: 2017-06-19 | End: 2017-06-19

## 2017-06-19 RX ADMIN — ATORVASTATIN CALCIUM 20 MG: 10 TABLET, FILM COATED ORAL at 22:06

## 2017-06-19 RX ADMIN — Medication 5 ML: at 15:25

## 2017-06-19 RX ADMIN — HYDROCODONE BITARTRATE AND ACETAMINOPHEN 1 TABLET: 5; 325 TABLET ORAL at 14:09

## 2017-06-19 RX ADMIN — Medication 5 ML: at 22:06

## 2017-06-19 RX ADMIN — HEPARIN SODIUM AND DEXTROSE 18 UNITS/KG/HR: 5000; 5 INJECTION INTRAVENOUS at 13:57

## 2017-06-19 RX ADMIN — CARVEDILOL 25 MG: 25 TABLET, FILM COATED ORAL at 17:24

## 2017-06-19 RX ADMIN — HYDRALAZINE HYDROCHLORIDE 10 MG: 10 TABLET, FILM COATED ORAL at 22:06

## 2017-06-19 RX ADMIN — ASPIRIN 81 MG: 81 TABLET, COATED ORAL at 14:08

## 2017-06-19 RX ADMIN — HYDRALAZINE HYDROCHLORIDE 10 MG: 10 TABLET, FILM COATED ORAL at 17:24

## 2017-06-19 RX ADMIN — HEPARIN SODIUM 2050 UNITS: 5000 INJECTION, SOLUTION INTRAVENOUS; SUBCUTANEOUS at 22:06

## 2017-06-19 NOTE — PROGRESS NOTES
Patient awake and resting supine in bed  Patient did not verbalize any spiritual care needs   Patient stated that he has plenty of support from family who live locally  I assured patient of continued support from pastoral care during hospitalization if desired    Kaci Mir III, PhD  Mell Munoz  [phone]

## 2017-06-19 NOTE — PROGRESS NOTES
Admission database completed. Patient oriented to room and call button.  New patient packet with  medication side effects fact sheet  given to patient and reviewed No concerns voiced at this time Primary nurse Taisha Collins RN updated

## 2017-06-19 NOTE — IP AVS SNAPSHOT
[facility] 
 
 
 [address] [place] [address] 
[phone] Patient: Garcia Norman MRN: [id number] LFE:8/53/[birth year]26 You are allergic to the following No active allergies Recent Documentation Height Weight BMI Smoking Status 1.753 m 59.1 kg 19.24 kg/m2 Current Some Day Smoker Emergency Contacts Name Discharge Info Relation Home Work Mobile Dio Abdi [24] [phone] About your hospitalization You were admitted on:  June 19, 2017 You last received care in the:  [facility] 7 MED SURG You were discharged on:  June 21, 2017 Unit phone number:  [phone] Why you were hospitalized Your primary diagnosis was:  Not on File Your diagnoses also included:  Iliac Artery Occlusion, Right (Hcc) Providers Seen During Your Hospitalizations Provider Role Specialty Primary office phone Johanna Chavarria MD Attending Provider Vascular Surgery [phone] Your Primary Care Physician (PCP) Primary Care Physician Office Phone Office Fax Landen Walter [phone] Follow-up Information Follow up With Details Comments Contact Info Marino Leonardo MD   [facility] [address] [facility] [address] [facility] [address] 
[phone] Johanna Chavarria MD On 7/11/2017 at 1:30pm [place] [address] [facility] [address] 
[phone] Your Appointments Tuesday July 11, 2017  1:30 PM EDT  
ESTABLISHED PATIENT with Johanna Chavarria MD  
[facility] [address] [place] [id number] [phone] Current Discharge Medication List  
  
CONTINUE these medications which have CHANGED Dose & Instructions Dispensing Information Comments Morning Noon Evening Bedtime * HYDROcodone-acetaminophen 5-325 mg per tablet Commonly known as:  Sanket Apodaca What changed:  Another medication with the same name was added. Make sure you understand how and when to take each. Your last dose was:  5:33 a.m. Your next dose is:  1:33 p.m. Dose:  1-2 Tab Take 1-2 Tabs by mouth every eight (8) hours as needed for Pain. Max Daily Amount: 6 Tabs. Quantity:  30 Tab Refills:  0  
     
   
   
   
  
 * HYDROcodone-acetaminophen 5-325 mg per tablet Commonly known as:  Sanket Apodaca What changed: You were already taking a medication with the same name, and this prescription was added. Make sure you understand how and when to take each. Dose:  1 Tab Take 1 Tab by mouth every four (4) hours as needed. Max Daily Amount: 6 Tabs. Quantity:  30 Tab Refills:  0  
     
   
   
   
  
 * Notice: This list has 2 medication(s) that are the same as other medications prescribed for you. Read the directions carefully, and ask your doctor or other care provider to review them with you. CONTINUE these medications which have NOT CHANGED Dose & Instructions Dispensing Information Comments Morning Noon Evening Bedtime  
 amLODIPine 10 mg tablet Commonly known as:  Barabara Puls Your next dose is:  Tomorrow Morning 6/22/2017 Dose:  10 mg Take 1 Tab by mouth daily. Quantity:  30 Tab Refills:  5  
     
  
   
   
   
  
 aspirin delayed-release 81 mg tablet Your next dose is:  Tomorrow Morning 6/22/2017 Dose:  81 mg Take 81 mg by mouth daily. Refills:  0  
     
  
   
   
   
  
 atorvastatin 20 mg tablet Commonly known as:  LIPITOR Your next dose is:  Tomorrow Morning 6/22/2017 Dose:  20 mg Take 1 Tab by mouth daily. Quantity:  30 Tab Refills:  5  
     
  
   
   
   
  
 carvedilol 25 mg tablet Commonly known as:  Render Blonder Your next dose is: Take tonight with evening meal 6/21/2017  Dose:  25 mg  
 Take 1 Tab by mouth two (2) times daily (with meals). Quantity:  60 Tab Refills:  5  
     
  
   
   
  
   
  
 hydrALAZINE 10 mg tablet Commonly known as:  APRESOLINE Your next dose is:  TODAY with lunch and evening meal  
   
 Dose:  10 mg Take 1 Tab by mouth three (3) times daily. Quantity:  90 Tab Refills:  5  
     
  
   
  
   
  
   
  
 hydroCHLOROthiazide 25 mg tablet Commonly known as:  HYDRODIURIL Your next dose is:  Tomorrow Morning 6/22/2017 Dose:  25 mg Take 1 Tab by mouth daily. Quantity:  30 Tab Refills:  5  
     
  
   
   
   
  
 sildenafil citrate 50 mg tablet Commonly known as:  VIAGRA Dose:  50 mg Take 1 Tab by mouth as needed. Indications: Erectile Dysfunction Quantity:  6 Tab Refills:  2  
     
   
   
   
  
 traMADol 50 mg tablet Commonly known as:  ULTRAM  
Your next dose is: Take on as needed schedule 1 tab p.o. twice daily as needed pain. Indications: Pain Quantity:  40 Tab Refills:  0 Where to Get Your Medications Information on where to get these meds will be given to you by the nurse or doctor. ! Ask your nurse or doctor about these medications HYDROcodone-acetaminophen 5-325 mg per tablet Discharge Instructions MAY showerNO tub baths until cleared by your doctor NO strenuous activity until directed by your doctor No lifting more than 5 lbs NO driving until directed by doctor Avoid having pets sleep in bed with you until  incision is completely healed MONITOR your incision and CALL your doctor ([phone]) or come to the Emergency room if:   
Bruising that spreads The area becomes very hard and painful Drainage from incisionespecially yellow and thick Incision opens up Red streaking that spreads from the incision Your right extremity  becomes blue in color, numb, cold and/or has tingling Pain is not managed with prescribed medication Fever >100.5 DISCHARGE SUMMARY from Nurse The following personal items are in your possession at time of discharge: 
 
Dental Appliances: Other (comment) (removed and in bag of clothes) Visual Aid: Glasses, With patient Home Medications: None Jewelry: None Clothing: Footwear, Pants, Shirt, Undergarments, With patient Other Valuables: Marlene Masker, Cell Phone Personal Items Sent to Safe: none PATIENT INSTRUCTIONS: 
 
 
F-face looks uneven A-arms unable to move or move unevenly S-speech slurred or non-existent T-time-call 911 as soon as signs and symptoms begin-DO NOT go Back to bed or wait to see if you get better-TIME IS BRAIN. Warning Signs of HEART ATTACK Call 911 if you have these symptoms: 
? Chest discomfort. Most heart attacks involve discomfort in the center of the chest that lasts more than a few minutes, or that goes away and comes back. It can feel like uncomfortable pressure, squeezing, fullness, or pain. ? Discomfort in other areas of the upper body. Symptoms can include pain or discomfort in one or both arms, the back, neck, jaw, or stomach. ? Shortness of breath with or without chest discomfort. ? Other signs may include breaking out in a cold sweat, nausea, or lightheadedness. Don't wait more than five minutes to call [address]! Fast action can save your life. Calling 911 is almost always the fastest way to get lifesaving treatment. Emergency Medical Services staff can begin treatment when they arrive  up to an hour sooner than if someone gets to the hospital by car. The discharge information has been reviewed with the patient. The patient verbalized understanding.  
 
Discharge medications reviewed with the patient and appropriate educational materials and side effects teaching were provided. Discharge Orders None [email or web address] Announcement We are excited to announce that we are making your provider's discharge notes available to you in [email or web address]. You will see these notes when they are completed and signed by the physician that discharged you from your recent hospital stay. If you have any questions or concerns about any information you see in [email or web address], please call the Health Information Department where you were seen or reach out to your Primary Care Provider for more information about your plan of care. Introducing [facility]! Blade Garcia introduces [email or web address] patient portal. Now you can access parts of your medical record, email your doctor's office, and request medication refills online. 1. In your internet browser, go to [email or web address] 2. Click on the First Time User? Click Here link in the Sign In box. You will see the New Member Sign Up page. 3. Enter your [email or web address] Access Code exactly as it appears below. You will not need to use this code after youve completed the sign-up process. If you do not sign up before the expiration date, you must request a new code. · [email or web address] Access Code: [email or web address] Expires: 9/16/2017  6:48 AM 
 
4. Enter the last four digits of your Social Security Number (xxxx) and Date of Birth (mm/dd/yyyy) as indicated and click Submit. You will be taken to the next sign-up page. 5. Create a [email or web address] ID. This will be your [email or web address] login ID and cannot be changed, so think of one that is secure and easy to remember. 6. Create a [email or web address] password. You can change your password at any time. 7. Enter your Password Reset Question and Answer. This can be used at a later time if you forget your password. 8. Enter your e-mail address. You will receive e-mail notification when new information is available in [address] 9. Click Sign Up. You can now view and download portions of your medical record. 10. Click the Download Summary menu link to download a portable copy of your medical information. If you have questions, please visit the Frequently Asked Questions section of the Inc[employer] website. Remember, Incap is NOT to be used for urgent needs. For medical emergencies, dial 911. Now available from your iPhone and Android! General Information Please provide this summary of care documentation to your next provider. Patient Signature:  ____________________________________________________________ Date:  ____________________________________________________________  
  
Tiffanie Belcheryle Provider Signature:  ____________________________________________________________ Date:  ____________________________________________________________

## 2017-06-19 NOTE — ANESTHESIA PREPROCEDURE EVALUATION
Anesthetic History   No history of anesthetic complications            Review of Systems / Medical History  Patient summary reviewed and pertinent labs reviewed    Pulmonary          Smoker         Neuro/Psych       CVA: no residual symptoms       Cardiovascular    Hypertension          CAD    Exercise tolerance: >4 METS     GI/Hepatic/Renal         Renal disease (Creat 1.5): CRI       Endo/Other             Other Findings   Comments: Renal Artery Stenosis    Illiac occlusion         Physical Exam    Airway  Mallampati: II           Cardiovascular               Dental    Dentition: Edentulous     Pulmonary                 Abdominal         Other Findings            Anesthetic Plan    ASA: 3  Anesthesia type: general          Induction: Intravenous  Anesthetic plan and risks discussed with: Patient

## 2017-06-19 NOTE — H&P
Surgery History and Physical    Subjective: Terry Valerio is a 78 y.o. male who presented to the ER over the weekend with right lower hip and leg pain over the last week. Patient has a history of an aortobifem procedure done in [place] 20 years ago. Patient has history of tobacco abuse and hypertension. Patient said he presented to [facility] with similar symptoms last year, left leg pain in which they did embolectomy of his occluded left limb.       Past Medical History:   Diagnosis Date    Chronic kidney disease     CKD (chronic kidney disease)     Hyperlipidemia     Hypertension     PAD (peripheral artery disease) ([address]     Renal artery stenosis (HCC)     Spinal stenosis     Stroke [facility])      Past Surgical History:   Procedure Laterality Date    HX HERNIA REPAIR      VASCULAR SURGERY PROCEDURE UNLIST      stents to L femoral, aorto-femoral bypass      Family History   Problem Relation Age of Onset    Stroke Father     Hypertension Father     Stroke Mother     Hypertension Mother     Cancer Brother      pancreatic     Social History   Substance Use Topics    Smoking status: Current Some Day Smoker     Packs/day: 0.25     Years: 40.00     Types: Cigarettes    Smokeless tobacco: Never Used    Alcohol use No      Prior to Admission medications    Medication Sig Start Date End Date Taking? Authorizing Provider   HYDROcodone-acetaminophen (NORCO) 5-325 mg per tablet Take 1-2 Tabs by mouth every eight (8) hours as needed for Pain. Max Daily Amount: 6 Tabs. 6/18/17   David Byrd MD   traMADol Carolee Aiken) 50 mg tablet 1 tab p.o. twice daily as needed pain. Indications: Pain 4/21/17   Imelda Roberts MD   sildenafil citrate (VIAGRA) 50 mg tablet Take 1 Tab by mouth as needed. Indications: Erectile Dysfunction 3/24/17   Imelda Roberts MD   atorvastatin (LIPITOR) 20 mg tablet Take 1 Tab by mouth daily.  2/22/17   Lisa Pacheco NP   hydrALAZINE (APRESOLINE) 10 mg tablet Take 1 Tab by mouth three (3) times daily. 17   Crump Blackstock, NP   amLODIPine (NORVASC) 10 mg tablet Take 1 Tab by mouth daily. 17   Crump Blackstock, NP   carvedilol (COREG) 25 mg tablet Take 1 Tab by mouth two (2) times daily (with meals). 17   José Blackstock, NP   hydroCHLOROthiazide (HYDRODIURIL) 25 mg tablet Take 1 Tab by mouth daily. 17   José Blackstock, NP   aspirin delayed-release 81 mg tablet Take 81 mg by mouth daily. Historical Provider      No Known Allergies    Review of Systems:  A comprehensive review of systems was negative except for that written in the History of Present Illness. Objective:     Patient Vitals for the past 8 hrs:   BP Temp Pulse Resp SpO2 Height Weight   17 1502 143/51 98.3 °F (36.8 °C) 66 16 95 % - -   17 1404 - - - - - - 130 lb 4.8 oz (59.1 kg)   17 1249 - - - - - 5' 9\" (1.753 m) 137 lb (62.1 kg)   17 1226 147/79 97.7 °F (36.5 °C) 67 16 98 % - -     Temp (24hrs), Av °F (36.7 °C), Min:97.7 °F (36.5 °C), Max:98.3 °F (36.8 °C)      Physical Exam:  GENERAL: alert, cooperative, no distress, appears stated age, LUNG: clear to auscultation bilaterally, HEART: regular rate and rhythm, S1, S2 normal, no murmur, click, rub or gallop, EXTREMITIES:  No palpable femoral pulse, palpable left femoral pulse, lower extremity warm motor and sensory intact    Assessment/Plan:      -NPO after midnight  -Ancef on-call to OR  -Heparin drip  -Right iliac thrombectomy with possible intervention to be performed by Dr. Denita Hodge on 17          Signed By: Esthela Cardoso NP     2017       Elements of this note have been dictated using speech recognition software. As a result, errors of speech recognition may have occurred.

## 2017-06-19 NOTE — PROGRESS NOTES
Primary Nurse Lydia Rosenthal RN and Yoselyn Lam RN performed a dual skin assessment on this patient No impairment noted

## 2017-06-20 ENCOUNTER — APPOINTMENT (OUTPATIENT)
Dept: INTERVENTIONAL RADIOLOGY/VASCULAR | Age: 67
DRG: 253 | End: 2017-06-20
Attending: SURGERY
Payer: MEDICARE

## 2017-06-20 ENCOUNTER — ANESTHESIA (OUTPATIENT)
Dept: SURGERY | Age: 67
DRG: 253 | End: 2017-06-20
Payer: MEDICARE

## 2017-06-20 LAB
ANION GAP BLD CALC-SCNC: 12 MMOL/L (ref 7–16)
APTT PPP: 74.4 SEC (ref 23.5–31.7)
BACTERIA SPEC CULT: NORMAL
BUN SERPL-MCNC: 11 MG/DL (ref 8–23)
CALCIUM SERPL-MCNC: 8.4 MG/DL (ref 8.3–10.4)
CHLORIDE SERPL-SCNC: 103 MMOL/L (ref 98–107)
CO2 SERPL-SCNC: 27 MMOL/L (ref 21–32)
CREAT SERPL-MCNC: 1.47 MG/DL (ref 0.8–1.5)
ERYTHROCYTE [DISTWIDTH] IN BLOOD BY AUTOMATED COUNT: 13.7 % (ref 11.9–14.6)
GLUCOSE SERPL-MCNC: 87 MG/DL (ref 65–100)
HCT VFR BLD AUTO: 42.2 % (ref 41.1–50.3)
HGB BLD-MCNC: 13.9 G/DL (ref 13.6–17.2)
MCH RBC QN AUTO: 29.8 PG (ref 26.1–32.9)
MCHC RBC AUTO-ENTMCNC: 32.9 G/DL (ref 31.4–35)
MCV RBC AUTO: 90.6 FL (ref 79.6–97.8)
PLATELET # BLD AUTO: 233 K/UL (ref 150–450)
PMV BLD AUTO: 10.6 FL (ref 10.8–14.1)
POTASSIUM SERPL-SCNC: 3 MMOL/L (ref 3.5–5.1)
RBC # BLD AUTO: 4.66 M/UL (ref 4.23–5.67)
SERVICE CMNT-IMP: NORMAL
SODIUM SERPL-SCNC: 142 MMOL/L (ref 136–145)
WBC # BLD AUTO: 8.4 K/UL (ref 4.3–11.1)

## 2017-06-20 PROCEDURE — C1768 GRAFT, VASCULAR: HCPCS | Performed by: SURGERY

## 2017-06-20 PROCEDURE — 77030019908 HC STETH ESOPH SIMS -A: Performed by: ANESTHESIOLOGY

## 2017-06-20 PROCEDURE — 74011250636 HC RX REV CODE- 250/636: Performed by: NURSE PRACTITIONER

## 2017-06-20 PROCEDURE — 77030018836 HC SOL IRR NACL ICUM -A: Performed by: SURGERY

## 2017-06-20 PROCEDURE — C1894 INTRO/SHEATH, NON-LASER: HCPCS

## 2017-06-20 PROCEDURE — 77030008703 HC TU ET UNCUF COVD -A: Performed by: ANESTHESIOLOGY

## 2017-06-20 PROCEDURE — 77030014008 HC SPNG HEMSTAT J&J -C: Performed by: SURGERY

## 2017-06-20 PROCEDURE — 74011000250 HC RX REV CODE- 250

## 2017-06-20 PROCEDURE — 77030010512 HC APPL CLP LIG J&J -C: Performed by: SURGERY

## 2017-06-20 PROCEDURE — 74011250636 HC RX REV CODE- 250/636

## 2017-06-20 PROCEDURE — 77030034850: Performed by: SURGERY

## 2017-06-20 PROCEDURE — 77030010514 HC APPL CLP LIG COVD -B: Performed by: SURGERY

## 2017-06-20 PROCEDURE — 77030031139 HC SUT VCRL2 J&J -A: Performed by: SURGERY

## 2017-06-20 PROCEDURE — 77030008477 HC STYL SATN SLP COVD -A: Performed by: ANESTHESIOLOGY

## 2017-06-20 PROCEDURE — 85027 COMPLETE CBC AUTOMATED: CPT | Performed by: NURSE PRACTITIONER

## 2017-06-20 PROCEDURE — 77030020788: Performed by: SURGERY

## 2017-06-20 PROCEDURE — 74011250637 HC RX REV CODE- 250/637: Performed by: NURSE PRACTITIONER

## 2017-06-20 PROCEDURE — 74011250636 HC RX REV CODE- 250/636: Performed by: SURGERY

## 2017-06-20 PROCEDURE — 04CK0ZZ EXTIRPATION OF MATTER FROM RIGHT FEMORAL ARTERY, OPEN APPROACH: ICD-10-PCS | Performed by: SURGERY

## 2017-06-20 PROCEDURE — 80048 BASIC METABOLIC PNL TOTAL CA: CPT | Performed by: NURSE PRACTITIONER

## 2017-06-20 PROCEDURE — 74011250637 HC RX REV CODE- 250/637: Performed by: ANESTHESIOLOGY

## 2017-06-20 PROCEDURE — 87641 MR-STAPH DNA AMP PROBE: CPT | Performed by: SURGERY

## 2017-06-20 PROCEDURE — 76210000006 HC OR PH I REC 0.5 TO 1 HR: Performed by: SURGERY

## 2017-06-20 PROCEDURE — 74011000250 HC RX REV CODE- 250: Performed by: SURGERY

## 2017-06-20 PROCEDURE — 65270000029 HC RM PRIVATE

## 2017-06-20 PROCEDURE — C1757 CATH, THROMBECTOMY/EMBOLECT: HCPCS | Performed by: SURGERY

## 2017-06-20 PROCEDURE — 77030002996 HC SUT SLK J&J -A: Performed by: SURGERY

## 2017-06-20 PROCEDURE — 77030008467 HC STPLR SKN COVD -B: Performed by: SURGERY

## 2017-06-20 PROCEDURE — 77030002987 HC SUT PROL J&J -B: Performed by: SURGERY

## 2017-06-20 PROCEDURE — 77030002933 HC SUT MCRYL J&J -A: Performed by: SURGERY

## 2017-06-20 PROCEDURE — 76060000037 HC ANESTHESIA 3 TO 3.5 HR: Performed by: SURGERY

## 2017-06-20 PROCEDURE — 76010000173 HC OR TIME 3 TO 3.5 HR INTENSV-TIER 1: Performed by: SURGERY

## 2017-06-20 PROCEDURE — 04UK0KZ SUPPLEMENT RIGHT FEMORAL ARTERY WITH NONAUTOLOGOUS TISSUE SUBSTITUTE, OPEN APPROACH: ICD-10-PCS | Performed by: SURGERY

## 2017-06-20 PROCEDURE — 36415 COLL VENOUS BLD VENIPUNCTURE: CPT | Performed by: NURSE PRACTITIONER

## 2017-06-20 PROCEDURE — 77030011640 HC PAD GRND REM COVD -A: Performed by: SURGERY

## 2017-06-20 PROCEDURE — 77030020782 HC GWN BAIR PAWS FLX 3M -B: Performed by: ANESTHESIOLOGY

## 2017-06-20 PROCEDURE — 77030034888 HC SUT PROL 2 J&J -B: Performed by: SURGERY

## 2017-06-20 PROCEDURE — 85730 THROMBOPLASTIN TIME PARTIAL: CPT | Performed by: SURGERY

## 2017-06-20 DEVICE — XENOSURE BIOLOGIC PATCH, 0.8CM X 8CM, EIFU
Type: IMPLANTABLE DEVICE | Site: GROIN | Status: FUNCTIONAL
Brand: XENOSURE BIOLOGIC PATCH

## 2017-06-20 RX ORDER — FENTANYL CITRATE 50 UG/ML
INJECTION, SOLUTION INTRAMUSCULAR; INTRAVENOUS AS NEEDED
Status: DISCONTINUED | OUTPATIENT
Start: 2017-06-20 | End: 2017-06-20 | Stop reason: HOSPADM

## 2017-06-20 RX ORDER — NEOSTIGMINE METHYLSULFATE 1 MG/ML
INJECTION INTRAVENOUS AS NEEDED
Status: DISCONTINUED | OUTPATIENT
Start: 2017-06-20 | End: 2017-06-20 | Stop reason: HOSPADM

## 2017-06-20 RX ORDER — OXYCODONE HYDROCHLORIDE 5 MG/1
10 TABLET ORAL
Status: DISCONTINUED | OUTPATIENT
Start: 2017-06-20 | End: 2017-06-20

## 2017-06-20 RX ORDER — MIDAZOLAM HYDROCHLORIDE 1 MG/ML
2 INJECTION, SOLUTION INTRAMUSCULAR; INTRAVENOUS ONCE
Status: DISCONTINUED | OUTPATIENT
Start: 2017-06-20 | End: 2017-06-20 | Stop reason: HOSPADM

## 2017-06-20 RX ORDER — MORPHINE SULFATE 2 MG/ML
2 INJECTION, SOLUTION INTRAMUSCULAR; INTRAVENOUS
Status: DISCONTINUED | OUTPATIENT
Start: 2017-06-20 | End: 2017-06-21 | Stop reason: HOSPADM

## 2017-06-20 RX ORDER — FENTANYL CITRATE 50 UG/ML
100 INJECTION, SOLUTION INTRAMUSCULAR; INTRAVENOUS ONCE
Status: DISCONTINUED | OUTPATIENT
Start: 2017-06-20 | End: 2017-06-20 | Stop reason: HOSPADM

## 2017-06-20 RX ORDER — DIPHENHYDRAMINE HYDROCHLORIDE 50 MG/ML
12.5 INJECTION, SOLUTION INTRAMUSCULAR; INTRAVENOUS
Status: DISCONTINUED | OUTPATIENT
Start: 2017-06-20 | End: 2017-06-20

## 2017-06-20 RX ORDER — ALBUTEROL SULFATE 0.83 MG/ML
2.5 SOLUTION RESPIRATORY (INHALATION) AS NEEDED
Status: DISCONTINUED | OUTPATIENT
Start: 2017-06-20 | End: 2017-06-20

## 2017-06-20 RX ORDER — HEPARIN SODIUM 1000 [USP'U]/ML
INJECTION, SOLUTION INTRAVENOUS; SUBCUTANEOUS AS NEEDED
Status: DISCONTINUED | OUTPATIENT
Start: 2017-06-20 | End: 2017-06-20 | Stop reason: HOSPADM

## 2017-06-20 RX ORDER — MIDAZOLAM HYDROCHLORIDE 1 MG/ML
2 INJECTION, SOLUTION INTRAMUSCULAR; INTRAVENOUS
Status: DISCONTINUED | OUTPATIENT
Start: 2017-06-20 | End: 2017-06-20 | Stop reason: HOSPADM

## 2017-06-20 RX ORDER — SODIUM CHLORIDE, SODIUM LACTATE, POTASSIUM CHLORIDE, CALCIUM CHLORIDE 600; 310; 30; 20 MG/100ML; MG/100ML; MG/100ML; MG/100ML
INJECTION, SOLUTION INTRAVENOUS
Status: DISCONTINUED | OUTPATIENT
Start: 2017-06-20 | End: 2017-06-20 | Stop reason: HOSPADM

## 2017-06-20 RX ORDER — LIDOCAINE HYDROCHLORIDE 20 MG/ML
INJECTION, SOLUTION EPIDURAL; INFILTRATION; INTRACAUDAL; PERINEURAL AS NEEDED
Status: DISCONTINUED | OUTPATIENT
Start: 2017-06-20 | End: 2017-06-20 | Stop reason: HOSPADM

## 2017-06-20 RX ORDER — HEPARIN SODIUM 5000 [USP'U]/ML
INJECTION, SOLUTION INTRAVENOUS; SUBCUTANEOUS AS NEEDED
Status: DISCONTINUED | OUTPATIENT
Start: 2017-06-20 | End: 2017-06-20 | Stop reason: HOSPADM

## 2017-06-20 RX ORDER — PROPOFOL 10 MG/ML
INJECTION, EMULSION INTRAVENOUS AS NEEDED
Status: DISCONTINUED | OUTPATIENT
Start: 2017-06-20 | End: 2017-06-20 | Stop reason: HOSPADM

## 2017-06-20 RX ORDER — HYDROMORPHONE HYDROCHLORIDE 2 MG/ML
0.5 INJECTION, SOLUTION INTRAMUSCULAR; INTRAVENOUS; SUBCUTANEOUS
Status: DISCONTINUED | OUTPATIENT
Start: 2017-06-20 | End: 2017-06-20

## 2017-06-20 RX ORDER — NALOXONE HYDROCHLORIDE 0.4 MG/ML
0.1 INJECTION, SOLUTION INTRAMUSCULAR; INTRAVENOUS; SUBCUTANEOUS AS NEEDED
Status: DISCONTINUED | OUTPATIENT
Start: 2017-06-20 | End: 2017-06-20

## 2017-06-20 RX ORDER — ONDANSETRON 2 MG/ML
INJECTION INTRAMUSCULAR; INTRAVENOUS AS NEEDED
Status: DISCONTINUED | OUTPATIENT
Start: 2017-06-20 | End: 2017-06-20 | Stop reason: HOSPADM

## 2017-06-20 RX ORDER — BUPIVACAINE HYDROCHLORIDE 2.5 MG/ML
INJECTION, SOLUTION EPIDURAL; INFILTRATION; INTRACAUDAL AS NEEDED
Status: DISCONTINUED | OUTPATIENT
Start: 2017-06-20 | End: 2017-06-20 | Stop reason: HOSPADM

## 2017-06-20 RX ORDER — OXYCODONE HYDROCHLORIDE 5 MG/1
5 TABLET ORAL
Status: DISCONTINUED | OUTPATIENT
Start: 2017-06-20 | End: 2017-06-20

## 2017-06-20 RX ORDER — ONDANSETRON 2 MG/ML
4 INJECTION INTRAMUSCULAR; INTRAVENOUS ONCE
Status: DISCONTINUED | OUTPATIENT
Start: 2017-06-20 | End: 2017-06-20

## 2017-06-20 RX ORDER — SODIUM CHLORIDE, SODIUM LACTATE, POTASSIUM CHLORIDE, CALCIUM CHLORIDE 600; 310; 30; 20 MG/100ML; MG/100ML; MG/100ML; MG/100ML
100 INJECTION, SOLUTION INTRAVENOUS CONTINUOUS
Status: DISCONTINUED | OUTPATIENT
Start: 2017-06-20 | End: 2017-06-20

## 2017-06-20 RX ORDER — VECURONIUM BROMIDE FOR INJECTION 1 MG/ML
INJECTION, POWDER, LYOPHILIZED, FOR SOLUTION INTRAVENOUS AS NEEDED
Status: DISCONTINUED | OUTPATIENT
Start: 2017-06-20 | End: 2017-06-20 | Stop reason: HOSPADM

## 2017-06-20 RX ORDER — GLYCOPYRROLATE 0.2 MG/ML
INJECTION INTRAMUSCULAR; INTRAVENOUS AS NEEDED
Status: DISCONTINUED | OUTPATIENT
Start: 2017-06-20 | End: 2017-06-20 | Stop reason: HOSPADM

## 2017-06-20 RX ORDER — LIDOCAINE HYDROCHLORIDE 10 MG/ML
INJECTION INFILTRATION; PERINEURAL AS NEEDED
Status: DISCONTINUED | OUTPATIENT
Start: 2017-06-20 | End: 2017-06-20 | Stop reason: HOSPADM

## 2017-06-20 RX ORDER — SODIUM CHLORIDE, SODIUM LACTATE, POTASSIUM CHLORIDE, CALCIUM CHLORIDE 600; 310; 30; 20 MG/100ML; MG/100ML; MG/100ML; MG/100ML
100 INJECTION, SOLUTION INTRAVENOUS CONTINUOUS
Status: DISCONTINUED | OUTPATIENT
Start: 2017-06-20 | End: 2017-06-20 | Stop reason: HOSPADM

## 2017-06-20 RX ORDER — PROTAMINE SULFATE 10 MG/ML
INJECTION, SOLUTION INTRAVENOUS AS NEEDED
Status: DISCONTINUED | OUTPATIENT
Start: 2017-06-20 | End: 2017-06-20 | Stop reason: HOSPADM

## 2017-06-20 RX ORDER — LIDOCAINE HYDROCHLORIDE 10 MG/ML
0.1 INJECTION INFILTRATION; PERINEURAL AS NEEDED
Status: DISCONTINUED | OUTPATIENT
Start: 2017-06-20 | End: 2017-06-20 | Stop reason: HOSPADM

## 2017-06-20 RX ADMIN — VECURONIUM BROMIDE FOR INJECTION 6 MG: 1 INJECTION, POWDER, LYOPHILIZED, FOR SOLUTION INTRAVENOUS at 07:16

## 2017-06-20 RX ADMIN — FENTANYL CITRATE 25 MCG: 50 INJECTION, SOLUTION INTRAMUSCULAR; INTRAVENOUS at 09:53

## 2017-06-20 RX ADMIN — HEPARIN SODIUM 2000 UNITS: 1000 INJECTION, SOLUTION INTRAVENOUS; SUBCUTANEOUS at 09:03

## 2017-06-20 RX ADMIN — SODIUM CHLORIDE, SODIUM LACTATE, POTASSIUM CHLORIDE, CALCIUM CHLORIDE: 600; 310; 30; 20 INJECTION, SOLUTION INTRAVENOUS at 07:00

## 2017-06-20 RX ADMIN — LIDOCAINE HYDROCHLORIDE 50 MG: 20 INJECTION, SOLUTION EPIDURAL; INFILTRATION; INTRACAUDAL; PERINEURAL at 07:16

## 2017-06-20 RX ADMIN — FENTANYL CITRATE 50 MCG: 50 INJECTION, SOLUTION INTRAMUSCULAR; INTRAVENOUS at 09:37

## 2017-06-20 RX ADMIN — FENTANYL CITRATE 100 MCG: 50 INJECTION, SOLUTION INTRAMUSCULAR; INTRAVENOUS at 07:16

## 2017-06-20 RX ADMIN — CARVEDILOL 25 MG: 25 TABLET, FILM COATED ORAL at 05:20

## 2017-06-20 RX ADMIN — FENTANYL CITRATE 50 MCG: 50 INJECTION, SOLUTION INTRAMUSCULAR; INTRAVENOUS at 08:01

## 2017-06-20 RX ADMIN — Medication 5 ML: at 22:00

## 2017-06-20 RX ADMIN — Medication 2 MG: at 11:32

## 2017-06-20 RX ADMIN — Medication 10 ML: at 14:55

## 2017-06-20 RX ADMIN — HYDRALAZINE HYDROCHLORIDE 10 MG: 10 TABLET, FILM COATED ORAL at 22:00

## 2017-06-20 RX ADMIN — CEFAZOLIN 2 G: 1 INJECTION, POWDER, FOR SOLUTION INTRAMUSCULAR; INTRAVENOUS; PARENTERAL at 07:30

## 2017-06-20 RX ADMIN — SODIUM CHLORIDE, SODIUM LACTATE, POTASSIUM CHLORIDE, CALCIUM CHLORIDE: 600; 310; 30; 20 INJECTION, SOLUTION INTRAVENOUS at 08:46

## 2017-06-20 RX ADMIN — FENTANYL CITRATE 25 MCG: 50 INJECTION, SOLUTION INTRAMUSCULAR; INTRAVENOUS at 10:08

## 2017-06-20 RX ADMIN — HYDRALAZINE HYDROCHLORIDE 10 MG: 10 TABLET, FILM COATED ORAL at 17:13

## 2017-06-20 RX ADMIN — SODIUM CHLORIDE 100 ML/HR: 900 INJECTION, SOLUTION INTRAVENOUS at 00:29

## 2017-06-20 RX ADMIN — HYDROCODONE BITARTRATE AND ACETAMINOPHEN 1 TABLET: 5; 325 TABLET ORAL at 20:27

## 2017-06-20 RX ADMIN — OXYCODONE HYDROCHLORIDE 10 MG: 5 TABLET ORAL at 10:39

## 2017-06-20 RX ADMIN — CARVEDILOL 25 MG: 25 TABLET, FILM COATED ORAL at 17:13

## 2017-06-20 RX ADMIN — PROTAMINE SULFATE 50 MG: 10 INJECTION, SOLUTION INTRAVENOUS at 09:22

## 2017-06-20 RX ADMIN — VECURONIUM BROMIDE FOR INJECTION 1 MG: 1 INJECTION, POWDER, LYOPHILIZED, FOR SOLUTION INTRAVENOUS at 08:34

## 2017-06-20 RX ADMIN — Medication 2 MG: at 22:00

## 2017-06-20 RX ADMIN — NEOSTIGMINE METHYLSULFATE 3 MG: 1 INJECTION INTRAVENOUS at 09:47

## 2017-06-20 RX ADMIN — FENTANYL CITRATE 50 MCG: 50 INJECTION, SOLUTION INTRAMUSCULAR; INTRAVENOUS at 07:59

## 2017-06-20 RX ADMIN — Medication 2 MG: at 17:14

## 2017-06-20 RX ADMIN — ONDANSETRON 4 MG: 2 INJECTION INTRAMUSCULAR; INTRAVENOUS at 07:33

## 2017-06-20 RX ADMIN — LIDOCAINE HYDROCHLORIDE 50 MG: 20 INJECTION, SOLUTION EPIDURAL; INFILTRATION; INTRACAUDAL; PERINEURAL at 10:03

## 2017-06-20 RX ADMIN — HEPARIN SODIUM 5000 UNITS: 1000 INJECTION, SOLUTION INTRAVENOUS; SUBCUTANEOUS at 08:38

## 2017-06-20 RX ADMIN — PROPOFOL 150 MG: 10 INJECTION, EMULSION INTRAVENOUS at 07:16

## 2017-06-20 RX ADMIN — AMLODIPINE BESYLATE 10 MG: 10 TABLET ORAL at 05:21

## 2017-06-20 RX ADMIN — ASPIRIN 81 MG: 81 TABLET, COATED ORAL at 05:21

## 2017-06-20 RX ADMIN — GLYCOPYRROLATE 0.4 MG: 0.2 INJECTION INTRAMUSCULAR; INTRAVENOUS at 09:47

## 2017-06-20 RX ADMIN — ATORVASTATIN CALCIUM 20 MG: 10 TABLET, FILM COATED ORAL at 22:00

## 2017-06-20 RX ADMIN — HYDROCODONE BITARTRATE AND ACETAMINOPHEN 1 TABLET: 5; 325 TABLET ORAL at 14:55

## 2017-06-20 NOTE — BRIEF OP NOTE
[address]   [address] [facility] [address] FAX: [phone]    Brief Op Note Template Note    Pre-Op Diagnosis: Iliac artery thrombosis, right ([address] [I74.5]    Post-Op Diagnosis:  Iliac artery thrombosis, right ([address] [I74.5]    Procedures: Procedure(s):  RIGHT ILIAC THROMBECTOMY,  [address] ANGIOPLASTY     Surgeon: Suzanne Calvert MD    Assistants: Surgeon(s):  Suzanne Calvert MD      Anesthesia:  General     Findings: Open thrombectomy chronic SFA occlusion good inflow good back bleeding the profunda patch angioplasty with bovine pericardial patch closure dopplerable pedal pulses    Tourniquet Time:  * No tourniquets in log *    Estimated Blood Loss:               Specimens:            Implants:    Implant Name Type Inv. Item Serial No.  Lot No. LRB No. Used Action   [address] VASC PERIPATCH 0.8X8CM Illene Purl - [id number]   PATCH VASC [address] 0.8X8CM -- [employer] [id number] Right 1 Implanted       Complications: None               Signed: Suzanne Calvert MD      Elements of this note have been dictated using speech recognition software. As a result, errors of speech recognition may have occurred.

## 2017-06-20 NOTE — PERIOP NOTES
TRANSFER - IN REPORT:    Verbal report received from [facility]) on Mariella Enrique  being received from 7th floor rm 721(unit) for routine progression of care      Report consisted of patients Situation, Background, Assessment and   Recommendations(SBAR). Information from the following report(s) SBAR and MAR was reviewed with the receiving nurse. Opportunity for questions and clarification was provided. Assessment completed upon patients arrival to unit and care assumed.

## 2017-06-20 NOTE — PERIOP NOTES
TRANSFER - OUT REPORT:    Verbal report given to Marianne HIGGINS(name) on Arslan Raines  being transferred to 724(unit) for routine post - op       Report consisted of patients Situation, Background, Assessment and   Recommendations(SBAR). Information from the following report(s) SBAR, Kardex, OR Summary, Procedure Summary, Intake/Output and MAR was reviewed with the receiving nurse. Lines:   Peripheral IV 06/19/17 Left Arm (Active)   Site Assessment Clean, dry, & intact 6/20/2017 10:16 AM   Phlebitis Assessment 0 6/20/2017 10:16 AM   Infiltration Assessment 0 6/20/2017 10:16 AM   Dressing Status Clean, dry, & intact 6/20/2017 10:16 AM   Dressing Type Tape;Transparent 6/20/2017 10:16 AM   Hub Color/Line Status Pink;Capped 6/20/2017 10:16 AM       Peripheral IV 06/19/17 Left Arm (Active)   Site Assessment Clean, dry, & intact 6/20/2017 10:16 AM   Phlebitis Assessment 0 6/20/2017 10:16 AM   Infiltration Assessment 0 6/20/2017 10:16 AM   Dressing Status Clean, dry, & intact 6/20/2017 10:16 AM   Dressing Type Tape;Transparent 6/20/2017 10:16 AM   Hub Color/Line Status Pink; Infusing 6/20/2017 10:16 AM        Opportunity for questions and clarification was provided. Patient transported with:   O2 @ 3 liters    VTE prophylaxis orders have been written for Arslan Raines. Patient and family given floor number and nurses name. Family updated re: pt status after security code verified.

## 2017-06-20 NOTE — OP NOTES
[address]   OPERATIVE REPORT       Name:  Lindsay Manning   MR#:  [id number]   :  1950   Account #:  [de-identified]   Date of Adm:  2017       DATE OF SURGERY: 2017. CLINICAL SERVICE: Vascular Surgery. PREOPERATIVE DIAGNOSIS: Right limb occlusion of aortobifemoral   bypass. POSTOPERATIVE DIAGNOSIS: Right limb occlusion of aortobifemoral   bypass. NAME OF PROCEDURE:   1. Open common femoral iliac thromboembolectomy. 2. Right common femoral endarterectomy with bovine pericardial   patch closure, profundoplasty. SURGEON: Cathy Aragon MD.    ANESTHESIA: General.    COMPLICATIONS: None. INDICATIONS FOR PROCEDURE: This is a 62-year-old male with   history of aortobifemoral bypass done 20 years ago at an outside   hospital in [place]. He presented to the emergency   department 2 days ago with worsening pain in his right leg. The   patient has history of left limb occlusion requiring embolectomy   and repair at [facility] last year. CT scan   showed the patient had a right limb occlusion with chronically   occluded bilateral SFA arteries, but reconstitutes at the   popliteal artery with tibial runoff down to the floor. The   patient was not densely ischemic. The patient was initially   directed to be admitted to the hospital for IV heparin and   scheduled for elective procedure. The patient decided to go home   and presented 2 days later for elective procedure. PROCEDURE IN DETAIL: After giving informed consent, the patient   was brought back to the operating room. General anesthesia was   then induced. Preop antibiotics were given for skin incision. Bilateral groins were all prepped and draped in normal sterile   fashion. We made an incision over the previous groin incision. We dissected down with electrocautery and through the scar   tissue.  We dissected down proximally to the chronically SFA and   the profunda was then controlled to the first branch point with   vessel loops. We dissected up into the inguinal ligament until   the Dacron graft was then identified and was encircled and also   with the native common femoral artery which was occluded was   encircled. The patient was systemically heparinized with 7000   units of heparin. We then got control of the profunda distally. We did a longitudinal arteriotomy over the slightly aneurysmal   segment of the graft confirming it was occluded. We then removed   the clot, which we saw over the link of the graft. We initially   extended our incision over the profunda about 2 cm. We back bled   the profunda and the first branch of the profunda with good   backbleeding. We passed #3 Justin catheter down both profunda   branches and was able to get no evidence of any clot. These were   heparinized and flushed and had good control. We then passed a   #4 catheter into the occluded aortic limb and we were able to   get organized thrombus with backbleeding. We passed it multiple   times to good pulsatile flow. Then we upsized to a #5 Justin   catheter and placed it about 200 cm into the native aorta and   was able to get remove clot back. Good pulsatile flow was then   achieved and we were happy with the inflow. We then resected the   aneurysmal segment of the common femoral artery and the distal   patch about 3 cm on both sides. We then transected the   chronically occluded SFA and also ligated it with a 5-0 Prolene   closure. Once this was done, we brought into the field a bovine   pericardial patch. We did a patch angioplasty with 5-0 Prolene   suture, started proximally and running sutures on both sides. We   cut it to appropriate size and then did a 5-0 distally. Prior to   tying down we forward flushed the limb graft. We got pulsatile   bleeding. We back flushed both branches of the profunda and we   were happy. We then tied the sutures down.  The patient had good   Doppler signal distal in both the fundas and had a good Doppler   signal in the distal PTA. We were happy with the closure. We   then reversed the patient with 50 mg of protamine and held   pressure for 30 minutes. We then closed in multiple layers with   2-0 Vicryl for the fascia and two 3-0 Vicryl and a 4-0 Monocryl. The patient was extubated and taken to the PACU in stable   condition.         MD Sadie Mustafa / Ras Quiroz   D:  06/20/2017   10:19   T:  06/20/2017   10:54   Job #:  [id number]

## 2017-06-20 NOTE — ANESTHESIA POSTPROCEDURE EVALUATION
Post-Anesthesia Evaluation and Assessment    Patient: Dheeraj Araujo MRN: [id number]  SSN: [id number]    YOB: 1950  Age: 78 y.o. Sex: male       Cardiovascular Function/Vital Signs  Visit Vitals    /76    Pulse 63    Temp 36.9 °C (98.4 °F)    Resp 16    Ht 5' 9\" (1.753 m)    Wt 59.1 kg (130 lb 4.8 oz)    SpO2 97%    BMI 19.24 kg/m2       Patient is status post general anesthesia for Procedure(s):  RIGHT ILIAC THROMBECTOMY,  PATCH ANGIOPLASTY . Nausea/Vomiting: None    Postoperative hydration reviewed and adequate. Pain:  Pain Scale 1: Visual (06/20/17 1016)  Pain Intensity 1: 0 (06/20/17 1016)   Managed    Neurological Status:   Neuro (WDL): Exceptions to WDL (06/20/17 1016)  Neuro  Neurologic State: Drowsy;Sleeping (06/20/17 1016)   At baseline    Mental Status and Level of Consciousness: Arousable    Pulmonary Status:   O2 Device: Nasal cannula (06/20/17 1016)   Adequate oxygenation and airway patent    Complications related to anesthesia: None    Post-anesthesia assessment completed.  No concerns    Signed By: Leah Beltrán MD     June 20, 2017

## 2017-06-20 NOTE — PROGRESS NOTES
Problem: Nutrition Deficit  Goal: *Optimize nutritional status  Nutrition  Reason for assessment: Referral received from nursing admission Malnutrition Screening Tool for recently lost 10# without trying and eating poorly due to decreased appetite. Assessment:   Diet order(s): regular  Food/Nutrition Patient History:  The patient is s/p R iliac thrombectomy and patch angioplasty today. He reports a UBW of 145 pounds and states it has \"been a minute\" since he last weighed that. The patient reports no changes in overall intake or appetite and is unsure as to why he has had weight loss. He states that he has always eaten one meal per day and drinks a bottle of ensure daily. He has a history remarkable for CKD and tobacco abuse. Labs are remarkable for hypokalemia. Anthropometrics:Height: 5' 9\" (175.3 cm),  Weight: 59.1 kg (130 lb 4.8 oz), Weight Source: Bed, Body mass index is 19.24 kg/(m^2). BMI class of underweight for age. WT / BMI 6/19/2017 6/18/2017 4/21/2017 3/24/2017 3/10/2017   WEIGHT 130 lb 4.8 oz 137 lb 139 lb 141 lb 141 lb 3.2 oz       WT / BMI 2/22/2017 2/16/2017 2/16/2017   WEIGHT 146 lb 9.6 oz 147 lb 147 lb   The patient has had a potential 11 pound, 7.8% significant weight loss over 3 months and a 17 pound, 11.6% weight loss over 4 months. Macronutrient needs:  EER:  2973-6334 kcal /day (25-30 kcal/kg listed BW)  EPR:  47-71 grams protein/day (0.8-1.2 grams/kg listed BW)(GFR >60 as of 6/20)  Intake/Comparative Standards: Per RD meal rounds: NPO following procedure. RD took him a lunch tray. No recorded meal intake. Nutrition Diagnosis: Inadequate oral intake r/t decreased ability to consume sufficient oral intake as evidenced by patient with weight loss noted above, underweight for age. Intervention:  Meals and snacks: Continue current diet.   Nutrition Supplement Therapy: ensure enlive BID     Michael Ferrer, MS, [address], , [phone]

## 2017-06-20 NOTE — PROGRESS NOTES
TRANSFER - OUT REPORT:    Verbal report given to Jose Aj on [facility]  being transferred to Pre for routine progression of care       Report consisted of patients Situation, Background, Assessment and   Recommendations(SBAR). Information from the following report(s) SBAR and MAR was reviewed with the receiving nurse. Lines:   Peripheral IV 06/19/17 Left Arm (Active)   Site Assessment Clean, dry, & intact 6/19/2017  8:31 PM   Phlebitis Assessment 0 6/19/2017  8:31 PM   Infiltration Assessment 0 6/19/2017  8:31 PM   Dressing Status Clean, dry, & intact 6/19/2017  8:31 PM   Dressing Type Transparent;Tape 6/19/2017  8:31 PM   Hub Color/Line Status Flushed 6/19/2017  8:31 PM       Peripheral IV 06/19/17 Left Arm (Active)   Site Assessment Clean, dry, & intact 6/19/2017  8:31 PM   Phlebitis Assessment 0 6/19/2017  8:31 PM   Infiltration Assessment 0 6/19/2017  8:31 PM   Dressing Status Clean, dry, & intact 6/19/2017  8:31 PM   Dressing Type Tape;Transparent 6/19/2017  8:31 PM   Hub Color/Line Status Flushed 6/19/2017  8:31 PM        Opportunity for questions and clarification was provided.       Patient transported with:   Patient-specific medications from Pharmacy  Registered Nurse  Tech

## 2017-06-21 VITALS
HEIGHT: 69 IN | DIASTOLIC BLOOD PRESSURE: 74 MMHG | BODY MASS INDEX: 19.3 KG/M2 | OXYGEN SATURATION: 96 % | RESPIRATION RATE: 20 BRPM | WEIGHT: 130.3 LBS | SYSTOLIC BLOOD PRESSURE: 146 MMHG | HEART RATE: 64 BPM | TEMPERATURE: 98.3 F

## 2017-06-21 LAB
ANION GAP BLD CALC-SCNC: 9 MMOL/L (ref 7–16)
BUN SERPL-MCNC: 13 MG/DL (ref 8–23)
CALCIUM SERPL-MCNC: 8.3 MG/DL (ref 8.3–10.4)
CHLORIDE SERPL-SCNC: 103 MMOL/L (ref 98–107)
CO2 SERPL-SCNC: 29 MMOL/L (ref 21–32)
CREAT SERPL-MCNC: 1.52 MG/DL (ref 0.8–1.5)
ERYTHROCYTE [DISTWIDTH] IN BLOOD BY AUTOMATED COUNT: 13.5 % (ref 11.9–14.6)
GLUCOSE SERPL-MCNC: 95 MG/DL (ref 65–100)
HCT VFR BLD AUTO: 39.4 % (ref 41.1–50.3)
HGB BLD-MCNC: 13.1 G/DL (ref 13.6–17.2)
MCH RBC QN AUTO: 30 PG (ref 26.1–32.9)
MCHC RBC AUTO-ENTMCNC: 33.2 G/DL (ref 31.4–35)
MCV RBC AUTO: 90.2 FL (ref 79.6–97.8)
PLATELET # BLD AUTO: 216 K/UL (ref 150–450)
PMV BLD AUTO: 10.3 FL (ref 10.8–14.1)
POTASSIUM SERPL-SCNC: 3.2 MMOL/L (ref 3.5–5.1)
RBC # BLD AUTO: 4.37 M/UL (ref 4.23–5.67)
SODIUM SERPL-SCNC: 141 MMOL/L (ref 136–145)
WBC # BLD AUTO: 7.3 K/UL (ref 4.3–11.1)

## 2017-06-21 PROCEDURE — 74011250637 HC RX REV CODE- 250/637: Performed by: NURSE PRACTITIONER

## 2017-06-21 PROCEDURE — 36415 COLL VENOUS BLD VENIPUNCTURE: CPT | Performed by: NURSE PRACTITIONER

## 2017-06-21 PROCEDURE — 80048 BASIC METABOLIC PNL TOTAL CA: CPT | Performed by: NURSE PRACTITIONER

## 2017-06-21 PROCEDURE — 85027 COMPLETE CBC AUTOMATED: CPT | Performed by: NURSE PRACTITIONER

## 2017-06-21 RX ORDER — HYDROCODONE BITARTRATE AND ACETAMINOPHEN 5; 325 MG/1; MG/1
1 TABLET ORAL
Qty: 30 TAB | Refills: 0 | Status: SHIPPED | OUTPATIENT
Start: 2017-06-21 | End: 2017-07-25 | Stop reason: ALTCHOICE

## 2017-06-21 RX ADMIN — CARVEDILOL 25 MG: 25 TABLET, FILM COATED ORAL at 07:49

## 2017-06-21 RX ADMIN — HYDRALAZINE HYDROCHLORIDE 10 MG: 10 TABLET, FILM COATED ORAL at 07:49

## 2017-06-21 RX ADMIN — HYDROCODONE BITARTRATE AND ACETAMINOPHEN 1 TABLET: 5; 325 TABLET ORAL at 10:39

## 2017-06-21 RX ADMIN — AMLODIPINE BESYLATE 10 MG: 10 TABLET ORAL at 07:49

## 2017-06-21 RX ADMIN — HYDROCODONE BITARTRATE AND ACETAMINOPHEN 1 TABLET: 5; 325 TABLET ORAL at 00:48

## 2017-06-21 RX ADMIN — HYDROCHLOROTHIAZIDE 25 MG: 25 TABLET ORAL at 07:49

## 2017-06-21 RX ADMIN — HYDROCODONE BITARTRATE AND ACETAMINOPHEN 1 TABLET: 5; 325 TABLET ORAL at 05:33

## 2017-06-21 RX ADMIN — Medication 10 ML: at 05:33

## 2017-06-21 RX ADMIN — ASPIRIN 81 MG: 81 TABLET, COATED ORAL at 07:49

## 2017-06-21 NOTE — PROGRESS NOTES
Discharge order noted, pt will need lawrence removed and to void before discharge. Will follow-up with primary RN.

## 2017-06-21 NOTE — DISCHARGE SUMMARY
[address] FAX: [phone]          Physician Discharge Summary     Patient: Davide Calabrese MRN: [id number]  SSN: [id number]    YOB: 1950  Age: 78 y.o. Sex: male       Admit Date: 6/19/2017    Discharge Date: 6/21/2017      Admitting Physician: Alberto Felipe MD     Discharge Physician: Alberto Felipe MD    Admission Diagnoses: Iliac artery thrombosis, right [facility]) [I74.5]    Discharge Diagnoses:   Problem List as of 6/21/2017  Date Reviewed: 4/21/2017          Codes Class Noted - Resolved    Iliac artery occlusion, right ([facility] [address] ICD-10-CM: I74.5  ICD-9-CM: 444.81  6/19/2017 - Present        Coronary artery disease involving native coronary artery of native heart without angina pectoris ICD-10-CM: I25.10  ICD-9-CM: 414.01  4/21/2017 - Present        Left knee pain ICD-10-CM: M25.562  ICD-9-CM: 719.46  4/21/2017 - Present        Erectile dysfunction due to arterial insufficiency ICD-10-CM: N52.01  ICD-9-CM: 607.84  3/24/2017 - Present        Atopic rhinitis ICD-10-CM: J30.9  ICD-9-CM: 477.9  3/10/2017 - Present        Hypertension ICD-10-CM: I10  ICD-9-CM: 401.9  3/10/2017 - Present    Overview Signed 3/10/2017  8:41 AM by Glo Anthony MD     Last Assessment & Plan:   I'm unclear about the patient's compliance with his medications. We sent all his medications and again today to [facility].     We'll patient return in about 6 weeks' time and asked him to bring his medication bottles with him             Spinal stenosis ICD-10-CM: M48.00  ICD-9-CM: 724.00  3/10/2017 - Present        Pain of left hip joint ICD-10-CM: M25.552  ICD-9-CM: 719.45  3/10/2017 - Present        Acute pain of left knee ICD-10-CM: M25.562  ICD-9-CM: 719.46  3/10/2017 - Present        CKD (chronic kidney disease) stage 3, GFR 30-59 ml/min ICD-10-CM: N18.3  ICD-9-CM: 585.3  2/22/2017 - Present        Renal artery stenosis (HCC) ICD-10-CM: I70.1  ICD-9-CM: 440.1 2/22/2017 - Present        PAD (peripheral artery disease) (HCC) ICD-10-CM: I73.9  ICD-9-CM: 443.9  2/22/2017 - Present        Hyperlipidemia ICD-10-CM: E78.5  ICD-9-CM: 272.4  2/22/2017 - Present    Overview Signed 3/10/2017  8:41 AM by Rashawn Cutler MD     Last Assessment & Plan:   LDL at goal no changes             Tobacco abuse ICD-10-CM: Z72.0  ICD-9-CM: 305.1  2/22/2017 - Present        History of aorto-femoral bypass ICD-10-CM: Z95.828  ICD-9-CM: V15.1  12/7/2016 - Present        Chronic diastolic heart failure (HCC) ICD-10-CM: I50.32  ICD-9-CM: 428.32  11/14/2016 - Present    Overview Signed 3/10/2017  8:41 AM by Rashawn Cutler MD     Overview:   PAST HX DIASTOLIC CHF             Hypertensive intracerebral hemorrhage ([address] ICD-10-CM: I61.9  ICD-9-CM: 737  12/1/2015 - Present        RESOLVED: Chronic uremia ICD-10-CM: N18.9  ICD-9-CM: 585.9  3/10/2017 - 3/10/2017    Overview Signed 3/10/2017  8:41 AM by Rashawn Cutler MD     Last Assessment & Plan:   Creatinine stable             RESOLVED: Malignant hypertension ICD-10-CM: I10  ICD-9-CM: 401.0  12/1/2015 - 3/10/2017               Procedures for this admission: Procedure(s):  [address],  [address] [facility]     Discharged Condition: stable    Hospital Course: Uncomplicated patient was transferred to the floor postprocedure and was discharged home on postop day 1    Consults:     Significant Diagnostic Studies:     Treatments: Right iliofemoral embolectomy with profundoplasty    Discharge Exam: Right groin stable dopplerable pedal pulses    Disposition: Discharged home follow-up 2 weeks for wound check  Patient Instructions:   Current Discharge Medication List      START taking these medications    Details   !! HYDROcodone-acetaminophen (NORCO) 5-325 mg per tablet Take 1 Tab by mouth every four (4) hours as needed. Max Daily Amount: 6 Tabs. Qty: 30 Tab, Refills: 0       !! - Potential duplicate medications found.  Please discuss with provider. CONTINUE these medications which have NOT CHANGED    Details   !! HYDROcodone-acetaminophen (NORCO) 5-325 mg per tablet Take 1-2 Tabs by mouth every eight (8) hours as needed for Pain. Max Daily Amount: 6 Tabs. Qty: 30 Tab, Refills: 0      traMADol (ULTRAM) 50 mg tablet 1 tab p.o. twice daily as needed pain. Indications: Pain  Qty: 40 Tab, Refills: 0    Associated Diagnoses: Pain of left hip joint; Acute pain of left knee; Spinal stenosis of lumbosacral region; Left knee pain, unspecified chronicity      sildenafil citrate (VIAGRA) 50 mg tablet Take 1 Tab by mouth as needed. Indications: Erectile Dysfunction  Qty: 6 Tab, Refills: 2    Associated Diagnoses: Erectile dysfunction due to arterial insufficiency      atorvastatin (LIPITOR) 20 mg tablet Take 1 Tab by mouth daily. Qty: 30 Tab, Refills: 5    Associated Diagnoses: PAD (peripheral artery disease) ([address] Hyperlipidemia, unspecified hyperlipidemia type      hydrALAZINE (APRESOLINE) 10 mg tablet Take 1 Tab by mouth three (3) times daily. Qty: 90 Tab, Refills: 5    Associated Diagnoses: Malignant hypertension      amLODIPine (NORVASC) 10 mg tablet Take 1 Tab by mouth daily. Qty: 30 Tab, Refills: 5    Associated Diagnoses: Malignant hypertension      carvedilol (COREG) 25 mg tablet Take 1 Tab by mouth two (2) times daily (with meals). Qty: 60 Tab, Refills: 5    Associated Diagnoses: Malignant hypertension      hydroCHLOROthiazide (HYDRODIURIL) 25 mg tablet Take 1 Tab by mouth daily. Qty: 30 Tab, Refills: 5    Associated Diagnoses: Malignant hypertension      aspirin delayed-release 81 mg tablet Take 81 mg by mouth daily. !! - Potential duplicate medications found. Please discuss with provider. Reference discharge instructions as provided by nursing for diet, labs, medications, activity, wound care and any outpatient referrals.     Follow-up Appointments   Procedures    FOLLOW UP VISIT Appointment in: Two Weeks     Standing Status:   Standing     Number of Occurrences:   1     Order Specific Question:   Appointment in     Answer:    Two Weeks        Signed:  Juanita Treviño MD  6/21/2017  8:17 AM

## 2017-06-21 NOTE — DISCHARGE INSTRUCTIONS
MAY shower--NO tub baths until cleared by your doctor    NO strenuous activity until directed by your doctor    No lifting more than 5 lbs    NO driving until directed by doctor    Avoid having pets sleep in bed with you until  incision is completely healed    MONITOR your incision and CALL your doctor ([phone]) or come to the Emergency room if:    Bruising that spreads  The area becomes very hard and painful  Drainage from incision--especially yellow and thick  Incision opens up  Red streaking that spreads from the incision  Your right extremity  becomes blue in color, numb, cold and/or has tingling  Pain is not managed with prescribed medication  Fever >100.5      DISCHARGE SUMMARY from Nurse    The following personal items are in your possession at time of discharge:    Dental Appliances: Other (comment) (removed and in bag of clothes)  Visual Aid: Glasses, With patient     Home Medications: None  Jewelry: None  Clothing: Footwear, Pants, Shirt, Undergarments, With patient  Other Valuables: Wallet, Cell Phone  Personal Items Sent to Safe: none          PATIENT INSTRUCTIONS:    After general anesthesia or intravenous sedation, for 24 hours or while taking prescription Narcotics:  · Limit your activities  · Do not drive and operate hazardous machinery  · Do not make important personal or business decisions  · Do  not drink alcoholic beverages  · If you have not urinated within 8 hours after discharge, please contact your surgeon on call. Report the following to your surgeon:  · Excessive pain, swelling, redness or odor of or around the surgical area  · Temperature over 100.5  · Nausea and vomiting lasting longer than 4 hours or if unable to take medications  · Any signs of decreased circulation or nerve impairment to extremity: change in color, persistent  numbness, tingling, coldness or increase pain  · Any questions        What to do at Home:  Recommended activity: See above instructions, diet as tolerated. *  Please give a list of your current medications to your Primary Care Provider. *  Please update this list whenever your medications are discontinued, doses are      changed, or new medications (including over-the-counter products) are added. *  Please carry medication information at all times in case of emergency situations. These are general instructions for a healthy lifestyle:    No smoking/ No tobacco products/ Avoid exposure to second hand smoke    Surgeon General's Warning:  Quitting smoking now greatly reduces serious risk to your health. Obesity, smoking, and sedentary lifestyle greatly increases your risk for illness    A healthy diet, regular physical exercise & weight monitoring are important for maintaining a healthy lifestyle    You may be retaining fluid if you have a history of heart failure or if you experience any of the following symptoms:  Weight gain of 3 pounds or more overnight or 5 pounds in a week, increased swelling in our hands or feet or shortness of breath while lying flat in bed. Please call your doctor as soon as you notice any of these symptoms; do not wait until your next office visit. Recognize signs and symptoms of STROKE:    F-face looks uneven    A-arms unable to move or move unevenly    S-speech slurred or non-existent    T-time-call 911 as soon as signs and symptoms begin-DO NOT go       Back to bed or wait to see if you get better-TIME IS BRAIN. Warning Signs of HEART ATTACK     Call 911 if you have these symptoms:   Chest discomfort. Most heart attacks involve discomfort in the center of the chest that lasts more than a few minutes, or that goes away and comes back. It can feel like uncomfortable pressure, squeezing, fullness, or pain.  Discomfort in other areas of the upper body. Symptoms can include pain or discomfort in one or both arms, the back, neck, jaw, or stomach.  Shortness of breath with or without chest discomfort.    Other signs may include breaking out in a cold sweat, nausea, or lightheadedness. Don't wait more than five minutes to call 911 - MINUTES MATTER! Fast action can save your life. Calling 911 is almost always the fastest way to get lifesaving treatment. Emergency Medical Services staff can begin treatment when they arrive -- up to an hour sooner than if someone gets to the hospital by car. The discharge information has been reviewed with the patient. The patient verbalized understanding. Discharge medications reviewed with the patient and appropriate educational materials and side effects teaching were provided.

## 2017-06-21 NOTE — PROGRESS NOTES
[address] FAX: [phone]         VASCULAR SURGERY FLOOR PROGRESS NOTE    Admit Date: 2017  POD: 1 Day Post-Op    Procedure:  Procedure(s):  RIGHT ILIAC THROMBECTOMY,  PATCH ANGIOPLASTY     Subjective:     Patient has no new complaints. Objective:     Vitals:  Blood pressure 138/72, pulse 64, temperature 99.2 °F (37.3 °C), resp. rate 20, height 5' 9\" (1.753 m), weight 130 lb 4.8 oz (59.1 kg), SpO2 99 %. Temp (24hrs), Av.3 °F (36.8 °C), Min:97.5 °F (36.4 °C), Max:99.2 °F (37.3 °C)      Intake / Output:    Intake/Output Summary (Last 24 hours) at 17 9245  Last data filed at 17 0940   Gross per 24 hour   Intake             1200 ml   Output              550 ml   Net              650 ml       Physical Exam:    Constitutional: he appears well-developed. No distress. HENT:   Head: Atraumatic. Eyes: Pupils are equal, round, and reactive to light. Neck: Normal range of motion. Cardiovascular: Regular rhythm. Pulmonary/Chest: Effort normal and breath sounds normal. No respiratory distress. Abdominal: Soft. Bowel sounds are normal. he exhibits no distension. There is no tenderness. There is no guarding. No hernia. Musculoskeletal: Normal range of motion. Neurological: He is alert.  CN II- XII grossly intact  Vascular: right groin soft, foot warm    Labs:   Recent Labs      17   0507  17   0350   HGB  13.1*  13.9   WBC  7.3  8.4   K  3.2*  3.0*   GLU  95  87       Data Review    Assessment:     Patient Active Problem List    Diagnosis Date Noted    Iliac artery occlusion, right ([address] 2017    Coronary artery disease involving native coronary artery of native heart without angina pectoris 2017    Left knee pain 2017    Erectile dysfunction due to arterial insufficiency 2017    Atopic rhinitis 03/10/2017    Hypertension 03/10/2017    Spinal stenosis 03/10/2017    Pain of left hip joint 03/10/2017    Acute pain of left knee 03/10/2017    CKD (chronic kidney disease) stage 3, GFR 30-59 ml/min 02/22/2017    Renal artery stenosis (HCC) 02/22/2017    PAD [facility]peripheral artery disease) [facility] 02/22/2017    Hyperlipidemia 02/22/2017    Tobacco abuse 02/22/2017    History of aorto-femoral bypass 12/07/2016    Chronic diastolic heart failure ([facility] 11/14/2016    Hypertensive intracerebral hemorrhage ([facility] 12/01/2015       Plan/Recommendations/Medical Decision Making:     D/c home follow up 2 weeks for wound check    Elements of this note have been dictated using speech recognition software. As a result, errors of speech recognition may have occurred.

## 2017-06-21 NOTE — PROGRESS NOTES
Discharge instructions and prescriptions given and explained to pt. Pt verbalized understanding. Medication side effects sheet reviewed with pt. Pt to be discharged home, once he is able to void.

## 2017-07-03 PROBLEM — E87.6 HYPOKALEMIA: Status: ACTIVE | Noted: 2017-07-03

## 2017-07-31 PROBLEM — I65.23 BILATERAL CAROTID ARTERY STENOSIS: Status: ACTIVE | Noted: 2017-07-31

## 2017-08-11 PROBLEM — Z86.010 HISTORY OF COLON POLYPS: Status: ACTIVE | Noted: 2017-08-11

## 2017-08-11 PROBLEM — Z80.0 FH: COLON CANCER IN RELATIVE <50 YEARS OLD: Status: ACTIVE | Noted: 2017-08-11

## 2017-08-18 ENCOUNTER — HOSPITAL ENCOUNTER (OUTPATIENT)
Dept: ULTRASOUND IMAGING | Age: 67
Discharge: HOME | End: 2017-08-18
Attending: FAMILY MEDICINE
Payer: MEDICARE

## 2017-08-18 DIAGNOSIS — R60.0 LEG EDEMA, LEFT: ICD-10-CM

## 2017-08-18 PROCEDURE — 93971 EXTREMITY STUDY: CPT

## 2017-10-04 ENCOUNTER — ANESTHESIA EVENT (OUTPATIENT)
Dept: ENDOSCOPY | Age: 67
End: 2017-10-04
Payer: MEDICARE

## 2017-10-04 RX ORDER — SODIUM CHLORIDE 0.9 % (FLUSH) 0.9 %
5-10 SYRINGE (ML) INJECTION AS NEEDED
Status: CANCELLED | OUTPATIENT
Start: 2017-10-04

## 2017-10-04 RX ORDER — SODIUM CHLORIDE, SODIUM LACTATE, POTASSIUM CHLORIDE, CALCIUM CHLORIDE 600; 310; 30; 20 MG/100ML; MG/100ML; MG/100ML; MG/100ML
100 INJECTION, SOLUTION INTRAVENOUS CONTINUOUS
Status: CANCELLED | OUTPATIENT
Start: 2017-10-04

## 2017-10-04 NOTE — H&P
[facility]  [address] [place] [address], [address] [place] [address]  [phone]    H&P Note   Aydee Mccray   MRN: [id number]     : 1950        HPI: Aydee Mccray is a 78 y.o. male who is referred by Dr. Clau Luna for colonoscopy. The patient has a significant history for increased risk. He has had one prior colonoscopy in . One polyp was found. He was given a 5 year interval.  He has no GI symptoms. His family history is positive for a brother who was diagnosed with and then  of colon cancer at around the age of 52. There are no other family members that he is aware of who had colon cancer. He has daily bowel movements. Often he has 2-3 per day. Denies seeing any blood or mucus per rectum. His past surgical history is significant for aortobifemoral bypass in  in [place]. He has had revisions of both femoral limbs. The left limb was revised around  at [facility]. The right limb was revised in 2017 by Dr. Rosa Oakes. He was diagnosed with possible heart failure and was recently seen by Dr. Cinthia Silva. The patient reports to me that there were no significant findings in his evaluation. He continues to do a lot of work and is active. He denies any shortness of breath. He does have issues with bilateral ankle edema. Past Medical History:   Diagnosis Date    Chronic kidney disease     CKD (chronic kidney disease)     Hyperlipidemia     Hypertension     PAD (peripheral artery disease) (HCC)     Renal artery stenosis (HCC)     Spinal stenosis     Stroke [facility])      Past Surgical History:   Procedure Laterality Date    HX HERNIA REPAIR      VASCULAR SURGERY PROCEDURE UNLIST      stents to L femoral, aorto-femoral bypass    VASCULAR SURGERY PROCEDURE UNLIST Right 2017    fem-iliac thromboembolectomy and fem endarterectomy     No current facility-administered medications for this encounter.       Current Outpatient Prescriptions   Medication Sig    HYDROcodone-acetaminophen (NORCO) 5-325 mg per tablet TK 1 T PO  Q 4 H PRN    potassium chloride (KLOR-CON) 10 mEq tablet TAKE 1 TABLET BY MOUTH EVERY DAY    atorvastatin (LIPITOR) 20 mg tablet Take 1 Tab by mouth daily.  amLODIPine (NORVASC) 10 mg tablet Take 1 Tab by mouth daily.  carvedilol (COREG) 25 mg tablet Take 1 Tab by mouth two (2) times daily (with meals).  hydroCHLOROthiazide (HYDRODIURIL) 25 mg tablet Take 1 Tab by mouth daily.  hydrALAZINE (APRESOLINE) 25 mg tablet Take 2 po TID  Indications: hypertension    sildenafil citrate (VIAGRA) 50 mg tablet Take 1 Tab by mouth as needed. Indications: Erectile Dysfunction    aspirin delayed-release 81 mg tablet Take 81 mg by mouth daily. ALLERGIES:  Review of patient's allergies indicates no known allergies. Social History     Social History    Marital status: SINGLE     Spouse name: N/A    Number of children: N/A    Years of education: N/A     Social History Main Topics    Smoking status: Current Some Day Smoker     Packs/day: 0.25     Years: 40.00     Types: Cigarettes    Smokeless tobacco: Never Used    Alcohol use No    Drug use: No    Sexual activity: Yes     Partners: Female     Birth control/ protection: Condom     Other Topics Concern    Not on file     Social History Narrative     History   Smoking Status    Current Some Day Smoker    Packs/day: 0.25    Years: 40.00    Types: Cigarettes   Smokeless Tobacco    Never Used     Family History   Problem Relation Age of Onset    Stroke Father     Hypertension Father     Stroke Mother     Hypertension Mother     Cancer Brother      pancreatic       ROS: The patient has no difficulty with chest pain or shortness of breath. No fever or chills. The patient denies any personal or family history of abnormal clotting or bleeding. Comprehensive review of systems was otherwise unremarkable except as noted above.     Physical Exam:   Constitutional: Alert oriented cooperative patient in no acute distress. Visit Vitals    /82    Pulse 70    Ht 5' 9\" (1.753 m)    Wt 146 lb (66.2 kg)    SpO2 98%    BMI 21.56 kg/m2   Eyes:Sclera are clear without icterus. ENMT: no obvious neck masses, no ear or lip lesions  CV: RRR. Normal perfusion  Resp: No JVD. Breathing is  non-labored. GI: thin, soft and non-distended, well-healed full midline incision without hernia. Bounding femoral pulses. Musculoskeletal: unremarkable with normal function. Neuro:  No obvious focal deficits  Psychiatric: normal affect and mood, no memory impairment    Lab Results   Component Value Date/Time    WBC 7.3 06/21/2017 05:07 AM    HGB 13.1 06/21/2017 05:07 AM    HCT 39.4 06/21/2017 05:07 AM    PLATELET 213 54/34/7659 05:07 AM    MCV 90.2 06/21/2017 05:07 AM       Lab Results   Component Value Date/Time    Sodium 143 08/18/2017 11:30 AM    Potassium 4.5 08/18/2017 11:30 AM    Chloride 105 08/18/2017 11:30 AM    CO2 23 08/18/2017 11:30 AM    BUN 15 08/18/2017 11:30 AM    Creatinine 1.41 08/18/2017 11:30 AM    Glucose 102 08/18/2017 11:30 AM    INR 1.0 06/18/2017 09:38 AM    aPTT 74.4 06/20/2017 03:50 AM    Bilirubin, total 0.6 08/18/2017 11:30 AM    AST (SGOT) 33 08/18/2017 11:30 AM    Alk. phosphatase 71 08/18/2017 11:30 AM       Assessment/Plan:     Eduin Beach is a 78 y.o. male who has significant risk for colorectal cancer screening. He has a personal history of polyps. His last colonoscopy which was his only colonoscopy was 5 years ago. One polyp was removed. He has a strong family history of colon cancer with his brother dying of colon cancer at the age of 52. He has no GI symptoms. He has significant vascular disease. We discussed proceeding with colonoscopy. I discussed the patient's condition and treatment options with the patient.  I discussed risks of colonoscopy in language the patient could understand including bleeding, infection, aspiration, perforation, medication reaction, need for further endoscopy or surgery, abscess, fistula, SBO, DVT, PE, heart attack, stroke, renal failure, respiratory failure, ventilatory dependence, and death. The patient voiced understanding of all this and all questions were answered. Alternatives to colonoscopy were discussed also and risks of the alternatives. The patient requested that we proceed with colonoscopy. Informed consent was obtained. Problem List  Date Reviewed: 8/18/2017          Codes Class Noted    FH: colon cancer in relative <48years old ICD-10-CM: Z80.0  ICD-9-CM: V16.0  8/11/2017        History of colon polyps ICD-10-CM: Z86.010  ICD-9-CM: V12.72  8/11/2017        Bilateral carotid artery stenosis ICD-10-CM: I65.23  ICD-9-CM: 433.10, 433.30  7/31/2017        Hypokalemia ICD-10-CM: E87.6  ICD-9-CM: 276.8  7/3/2017        Iliac artery occlusion, right (HCC) ICD-10-CM: I74.5  ICD-9-CM: 444.81  6/19/2017        Coronary artery disease involving native coronary artery of native heart without angina pectoris ICD-10-CM: I25.10  ICD-9-CM: 414.01  4/21/2017        Left knee pain ICD-10-CM: M25.562  ICD-9-CM: 719.46  4/21/2017        Erectile dysfunction due to arterial insufficiency ICD-10-CM: N52.01  ICD-9-CM: 607.84  3/24/2017        Atopic rhinitis ICD-10-CM: J30.9  ICD-9-CM: 477.9  3/10/2017        Hypertension ICD-10-CM: I10  ICD-9-CM: 401.9  3/10/2017    Overview Signed 3/10/2017  8:41 AM by Rebecca Mendiola MD     Last Assessment & Plan:   I'm unclear about the patient's compliance with his medications. We sent all his medications and again today to [facility].     We'll patient return in about 6 weeks' time and asked him to bring his medication bottles with him             Spinal stenosis ICD-10-CM: M48.00  ICD-9-CM: 724.00  3/10/2017        Pain of left hip joint ICD-10-CM: M25.552  ICD-9-CM: 719.45  3/10/2017        Acute pain of left knee ICD-10-CM: M25.562  ICD-9-CM: 719.46  3/10/2017        CKD (chronic kidney disease) stage 3, GFR 30-59 ml/min ICD-10-CM: N18.3  ICD-9-CM: 585.3  2/22/2017        Renal artery stenosis (HCC) ICD-10-CM: I70.1  ICD-9-CM: 440.1  2/22/2017        PAD (peripheral artery disease) ([facility] ICD-10-CM: I73.9  ICD-9-CM: 443.9  2/22/2017        Hyperlipidemia ICD-10-CM: E78.5  ICD-9-CM: 272.4  2/22/2017    Overview Signed 3/10/2017  8:41 AM by Rebecca Mendiola MD     Last Assessment & Plan:   LDL at goal no changes             Tobacco abuse ICD-10-CM: Z72.0  ICD-9-CM: 305.1  2/22/2017        History of aorto-femoral bypass ICD-10-CM: Z95.828  ICD-9-CM: V15.1  12/7/2016        Chronic diastolic heart failure ([facility] ICD-10-CM: I50.32  ICD-9-CM: 428.32  11/14/2016    Overview Signed 3/10/2017  8:41 AM by Rebecca Mendiola MD     Overview:   PAST HX DIASTOLIC CHF             Hypertensive intracerebral hemorrhage ([facility] ICD-10-CM: I61.9  ICD-9-CM: 637  12/1/2015                Jere Willoughby MD,  FACS

## 2017-10-05 ENCOUNTER — ANESTHESIA (OUTPATIENT)
Dept: ENDOSCOPY | Age: 67
End: 2017-10-05
Payer: MEDICARE

## 2017-10-05 ENCOUNTER — HOSPITAL ENCOUNTER (OUTPATIENT)
Age: 67
Setting detail: OUTPATIENT SURGERY
Discharge: HOME | End: 2017-10-05
Attending: SURGERY | Admitting: SURGERY
Payer: MEDICARE

## 2017-10-05 VITALS
HEIGHT: 69 IN | BODY MASS INDEX: 19.99 KG/M2 | SYSTOLIC BLOOD PRESSURE: 186 MMHG | WEIGHT: 135 LBS | RESPIRATION RATE: 16 BRPM | HEART RATE: 70 BPM | DIASTOLIC BLOOD PRESSURE: 109 MMHG | TEMPERATURE: 97 F | OXYGEN SATURATION: 99 %

## 2017-10-05 PROCEDURE — 76040000026: Performed by: SURGERY

## 2017-10-05 PROCEDURE — 74011000250 HC RX REV CODE- 250

## 2017-10-05 PROCEDURE — 74011250636 HC RX REV CODE- 250/636

## 2017-10-05 PROCEDURE — 77030011640 HC PAD GRND REM COVD -A: Performed by: SURGERY

## 2017-10-05 PROCEDURE — 76060000032 HC ANESTHESIA 0.5 TO 1 HR: Performed by: SURGERY

## 2017-10-05 PROCEDURE — 74011250636 HC RX REV CODE- 250/636: Performed by: ANESTHESIOLOGY

## 2017-10-05 PROCEDURE — 77030013991 HC SNR POLYP ENDOSC BSC -A: Performed by: SURGERY

## 2017-10-05 PROCEDURE — 88305 TISSUE EXAM BY PATHOLOGIST: CPT | Performed by: SURGERY

## 2017-10-05 PROCEDURE — 77030007289 HC DEV ROTH NET RETRV STRC -B: Performed by: SURGERY

## 2017-10-05 RX ORDER — PROPOFOL 10 MG/ML
INJECTION, EMULSION INTRAVENOUS
Status: DISCONTINUED | OUTPATIENT
Start: 2017-10-05 | End: 2017-10-05 | Stop reason: HOSPADM

## 2017-10-05 RX ORDER — PROPOFOL 10 MG/ML
INJECTION, EMULSION INTRAVENOUS AS NEEDED
Status: DISCONTINUED | OUTPATIENT
Start: 2017-10-05 | End: 2017-10-05 | Stop reason: HOSPADM

## 2017-10-05 RX ORDER — SODIUM CHLORIDE, SODIUM LACTATE, POTASSIUM CHLORIDE, CALCIUM CHLORIDE 600; 310; 30; 20 MG/100ML; MG/100ML; MG/100ML; MG/100ML
100 INJECTION, SOLUTION INTRAVENOUS CONTINUOUS
Status: DISCONTINUED | OUTPATIENT
Start: 2017-10-05 | End: 2017-10-05 | Stop reason: HOSPADM

## 2017-10-05 RX ORDER — LIDOCAINE HYDROCHLORIDE 20 MG/ML
INJECTION, SOLUTION EPIDURAL; INFILTRATION; INTRACAUDAL; PERINEURAL AS NEEDED
Status: DISCONTINUED | OUTPATIENT
Start: 2017-10-05 | End: 2017-10-05 | Stop reason: HOSPADM

## 2017-10-05 RX ORDER — HYDRALAZINE HYDROCHLORIDE 20 MG/ML
INJECTION INTRAMUSCULAR; INTRAVENOUS AS NEEDED
Status: DISCONTINUED | OUTPATIENT
Start: 2017-10-05 | End: 2017-10-05 | Stop reason: HOSPADM

## 2017-10-05 RX ADMIN — PROPOFOL 50 MG: 10 INJECTION, EMULSION INTRAVENOUS at 07:07

## 2017-10-05 RX ADMIN — HYDRALAZINE HYDROCHLORIDE 20 MG: 20 INJECTION INTRAMUSCULAR; INTRAVENOUS at 07:25

## 2017-10-05 RX ADMIN — LIDOCAINE HYDROCHLORIDE 60 MG: 20 INJECTION, SOLUTION EPIDURAL; INFILTRATION; INTRACAUDAL; PERINEURAL at 07:07

## 2017-10-05 RX ADMIN — PROPOFOL 140 MCG/KG/MIN: 10 INJECTION, EMULSION INTRAVENOUS at 07:07

## 2017-10-05 RX ADMIN — SODIUM CHLORIDE, SODIUM LACTATE, POTASSIUM CHLORIDE, AND CALCIUM CHLORIDE 100 ML/HR: 600; 310; 30; 20 INJECTION, SOLUTION INTRAVENOUS at 06:39

## 2017-10-05 NOTE — INTERVAL H&P NOTE
H&P Update:  Tony Hedrick was seen and examined. History and physical has been reviewed. The patient has been examined.  There have been no significant clinical changes since the completion of the originally dated History and Physical.    Signed By: Oswaldo Vicente MD     October 5, 2017 6:55 AM

## 2017-10-05 NOTE — ANESTHESIA PREPROCEDURE EVALUATION
Anesthetic History   No history of anesthetic complications            Review of Systems / Medical History  Patient summary reviewed and pertinent labs reviewed    Pulmonary          Smoker         Neuro/Psych       CVA: no residual symptoms       Cardiovascular    Hypertension: poorly controlled          CAD    Exercise tolerance: >4 METS     GI/Hepatic/Renal         Renal disease (Creat 1.5): CRI       Endo/Other             Other Findings   Comments: Renal Artery Stenosis    Illiac occlusion           Physical Exam    Airway  Mallampati: I  TM Distance: 4 - 6 cm  Neck ROM: normal range of motion   Mouth opening: Normal     Cardiovascular    Rhythm: irregular  Rate: normal         Dental    Dentition: Edentulous and Full upper dentures     Pulmonary  Breath sounds clear to auscultation               Abdominal         Other Findings            Anesthetic Plan    ASA: 3  Anesthesia type: total IV anesthesia          Induction: Intravenous  Anesthetic plan and risks discussed with: Patient and Spouse

## 2017-10-05 NOTE — DISCHARGE INSTRUCTIONS
Gastrointestinal Colonoscopy/Flexible Sigmoidoscopy - Lower Exam Discharge Instructions  1. Call Dr. Hildegarde Scheuermann at [phone] for any problems or questions. 2. Contact the doctors office for follow up appointment as directed  3. Medication may cause drowsiness for several hours, therefore, do not drive or operate machinery for remainder of the day. 4. No alcohol today. 5. Ordinarily, you may resume regular diet and activity after exam unless otherwise specified by your physician. 6. Because of air put into your colon during exam, you may experience some abdominal distension, relieved by the passage of gas, for several hours. 7. Contact your physician if you have any of the following:  a. Excessive amount of bleeding - large amount when having a bowel movement. Occasional specks of blood with bowel movement would not be unusual.  b. Severe abdominal pain  c. Fever or Chills  8. Polyp Removal - follow these additional instructions  a. Clear liquid diet for the next meal (jell-o, broth, clear drinks)  b. Soft diet for 24 hours, then resume regular diet   c. Take Metamucil - 1 tablespoon in juice every morning for 3 days  d. No Aspirin, Advil, Aleve, Nuprin, Ibuprofen, or medications that contain these drugs for 2 weeks. Any additional instructions: Follow up in office in 2 weeks. Repeat Colonoscopy in 1 year. Instructions given by:   Roel Summers RN

## 2017-10-05 NOTE — ROUTINE PROCESS
VSS.  Patient awake and alert. Discharge instructions reviewed with patient and family. Pt and family voiced understanding of instructions and had no questions.

## 2017-10-05 NOTE — PROGRESS NOTES
Dr. Bandar Lin informed of elevated BP, no orders received at this time. Patient did not take BP meds this AM, Dr. Bandar Lin informed patient to make sure to take meds when he gets home.

## 2017-10-05 NOTE — IP AVS SNAPSHOT
[address] 
[phone] Patient: Denisa Crockett MRN: [id number] AUDREY:[birth year] You are allergic to the following No active allergies Recent Documentation Height Weight BMI Smoking Status 1.753 m 61.2 kg 19.94 kg/m2 Current Some Day Smoker Emergency Contacts Name Discharge Info Relation Home Work Mobile [employer] Jorge Abdi [24] [phone] About your hospitalization You were admitted on:  October 5, 2017 You last received care in the:  [facility] You were discharged on:  October 5, 2017 Unit phone number:  [phone] Why you were hospitalized Your primary diagnosis was:  Not on File Providers Seen During Your Hospitalizations Provider Role Specialty Primary office phone Marianna Pizano MD Attending Provider General Surgery [phone] Your Primary Care Physician (PCP) Primary Care Physician Office Phone Office Fax Benton Wills [phone] Follow-up Information None Current Discharge Medication List  
  
ASK your doctor about these medications Dose & Instructions Dispensing Information Comments Morning Noon Evening Bedtime  
 amLODIPine 10 mg tablet Commonly known as:  Elyn Coffer Your last dose was: Your next dose is:    
   
   
 Dose:  10 mg Take 1 Tab by mouth daily. Quantity:  30 Tab Refills:  5  
     
   
   
   
  
 aspirin delayed-release 81 mg tablet Your last dose was: Your next dose is:    
   
   
 Dose:  81 mg Take 81 mg by mouth daily. Refills:  0  
     
   
   
   
  
 atorvastatin 20 mg tablet Commonly known as:  LIPITOR Your last dose was: Your next dose is:    
   
   
 Dose:  20 mg Take 1 Tab by mouth daily. Quantity:  30 Tab Refills:  5 carvedilol 25 mg tablet Commonly known as:  Antonino Wellington Your last dose was: Your next dose is:    
   
   
 Dose:  25 mg Take 1 Tab by mouth two (2) times daily (with meals). Quantity:  60 Tab Refills:  5  
     
   
   
   
  
 hydrALAZINE 25 mg tablet Commonly known as:  APRESOLINE Your last dose was: Your next dose is: Take 2 po TID  Indications: hypertension Quantity:  180 Tab Refills:  0  
     
   
   
   
  
 hydroCHLOROthiazide 25 mg tablet Commonly known as:  HYDRODIURIL Your last dose was: Your next dose is:    
   
   
 Dose:  25 mg Take 1 Tab by mouth daily. Quantity:  30 Tab Refills:  5 HYDROcodone-acetaminophen 5-325 mg per tablet Commonly known as:  Dayan President Your last dose was: Your next dose is:    
   
   
 TK 1 T PO  Q 4 H PRN Refills:  0  
     
   
   
   
  
 potassium chloride 10 mEq tablet Commonly known as:  KLOR-CON Your last dose was: Your next dose is: TAKE 1 TABLET BY MOUTH EVERY DAY Quantity:  30 Tab Refills:  0  
     
   
   
   
  
 sildenafil citrate 50 mg tablet Commonly known as:  VIAGRA Your last dose was: Your next dose is:    
   
   
 Dose:  50 mg Take 1 Tab by mouth as needed. Indications: Erectile Dysfunction Quantity:  6 Tab Refills:  2 Discharge Instructions Gastrointestinal Colonoscopy/Flexible Sigmoidoscopy - Lower Exam Discharge Instructions 1. Call Dr. Ashleigh Moreira at [phone] for any problems or questions. 2. Contact the doctors office for follow up appointment as directed 3. Medication may cause drowsiness for several hours, therefore, do not drive or operate machinery for remainder of the day. 4. No alcohol today. 5. Ordinarily, you may resume regular diet and activity after exam unless otherwise specified by your physician. 6. Because of air put into your colon during exam, you may experience some abdominal distension, relieved by the passage of gas, for several hours. 7. Contact your physician if you have any of the following: 
a. Excessive amount of bleeding  large amount when having a bowel movement. Occasional specks of blood with bowel movement would not be unusual. 
b. Severe abdominal pain 
c. Fever or Chills 8. Polyp Removal  follow these additional instructions 
a. Clear liquid diet for the next meal (jell-o, broth, clear drinks) b. Soft diet for 24 hours, then resume regular diet  
c. Take Metamucil  1 tablespoon in juice every morning for 3 days 
d. No Aspirin, Advil, Aleve, Nuprin, Ibuprofen, or medications that contain these drugs for 2 weeks. Any additional instructions: Follow up in office in 2 weeks. Repeat Colonoscopy in 1 year. Instructions given by: Roel Summers RN Discharge Orders None Introducing [facility] SERVICES! Alistair Oropeza introduces [employer] patient portal. Now you can access parts of your medical record, email your doctor's office, and request medication refills online. 1. In your internet browser, go to [email or web address] 2. Click on the First Time User? Click Here link in the Sign In box. You will see the New Member Sign Up page. 3. Enter your [employer] Access Code exactly as it appears below. You will not need to use this code after youve completed the sign-up process. If you do not sign up before the expiration date, you must request a new code. · [employer] Access Code: [email or web address] Expires: 12/27/2017  2:50 PM 
 
4. Enter the last four digits of your Social Security Number (xxxx) and Date of Birth (mm/dd/yyyy) as indicated and click Submit. You will be taken to the next sign-up page. 5. Create a [employer] ID. This will be your [employer] login ID and cannot be changed, so think of one that is secure and easy to remember. 6. Create a [employer] password. You can change your password at any time. 7. Enter your Password Reset Question and Answer. This can be used at a later time if you forget your password. 8. Enter your e-mail address. You will receive e-mail notification when new information is available in [address] 9. Click Sign Up. You can now view and download portions of your medical record. 10. Click the Download Summary menu link to download a portable copy of your medical information. If you have questions, please visit the Frequently Asked Questions section of the [employer] website. Remember, [employer] is NOT to be used for urgent needs. For medical emergencies, dial 911. Now available from your iPhone and Android! General Information Please provide this summary of care documentation to your next provider. Patient Signature:  ____________________________________________________________ Date:  ____________________________________________________________  
  
Esteban Burgess Provider Signature:  ____________________________________________________________ Date:  ____________________________________________________________

## 2017-10-05 NOTE — PROGRESS NOTES
Dr. [employer] notified of BP. VO to instruct patient to take all home BP meds when he gets home.

## 2017-10-05 NOTE — ANESTHESIA POSTPROCEDURE EVALUATION
Post-Anesthesia Evaluation and Assessment    Patient: Ruthann East MRN: [id number]  SSN: [id number]    YOB: 1950  Age: 78 y.o. Sex: male       Cardiovascular Function/Vital Signs  Visit Vitals    BP (!) 174/133    Pulse 65    Temp 36.1 °C (97 °F)    Resp 16    Ht 5' 9\" (1.753 m)    Wt 61.2 kg (135 lb)    SpO2 99%    BMI 19.94 kg/m2       Patient is status post total IV anesthesia anesthesia for Procedure(s):  COLONOSCOPY/21   ENDOSCOPIC POLYPECTOMY. Nausea/Vomiting: None    Postoperative hydration reviewed and adequate. Pain:  Pain Scale 1: Numeric (0 - 10) (10/05/17 0815)  Pain Intensity 1: 0 (10/05/17 0815)   Managed    Neurological Status: At baseline    Mental Status and Level of Consciousness: Awake, alert    Pulmonary Status:   O2 Device: Nasal cannula (2L) (10/05/17 4296)   Adequate oxygenation and airway patent    Complications related to anesthesia: None    Post-anesthesia assessment completed. No immediate concerns. He will take his BP meds when he gets home.     Signed By: Ross Watts MD     October 5, 2017

## 2017-10-10 ENCOUNTER — HOSPITAL ENCOUNTER (EMERGENCY)
Age: 67
Discharge: HOME | End: 2017-10-10
Attending: EMERGENCY MEDICINE
Payer: MEDICARE

## 2017-10-10 VITALS
WEIGHT: 135 LBS | HEIGHT: 69 IN | DIASTOLIC BLOOD PRESSURE: 96 MMHG | BODY MASS INDEX: 19.99 KG/M2 | HEART RATE: 65 BPM | RESPIRATION RATE: 16 BRPM | TEMPERATURE: 98.2 F | OXYGEN SATURATION: 100 % | SYSTOLIC BLOOD PRESSURE: 201 MMHG

## 2017-10-10 DIAGNOSIS — H61.22 IMPACTED CERUMEN OF LEFT EAR: ICD-10-CM

## 2017-10-10 DIAGNOSIS — H65.92 LEFT OTITIS MEDIA WITH EFFUSION: Primary | ICD-10-CM

## 2017-10-10 PROCEDURE — 76010010392 HC REMOVAL IMPACTED WAX IRRIGATION/LVG UNI: Performed by: EMERGENCY MEDICINE

## 2017-10-10 PROCEDURE — 99283 EMERGENCY DEPT VISIT LOW MDM: CPT | Performed by: EMERGENCY MEDICINE

## 2017-10-10 PROCEDURE — 75810000150 HC RMVL IMPACTED CERUMEN 1 / 2: Performed by: EMERGENCY MEDICINE

## 2017-10-10 RX ORDER — FLUTICASONE PROPIONATE 50 MCG
2 SPRAY, SUSPENSION (ML) NASAL DAILY
Qty: 1 BOTTLE | Refills: 0 | Status: SHIPPED | OUTPATIENT
Start: 2017-10-10 | End: 2017-10-24

## 2017-10-10 RX ORDER — AMOXICILLIN AND CLAVULANATE POTASSIUM 875; 125 MG/1; MG/1
1 TABLET, FILM COATED ORAL 2 TIMES DAILY
Qty: 14 TAB | Refills: 0 | Status: SHIPPED | OUTPATIENT
Start: 2017-10-10 | End: 2017-10-17

## 2017-10-10 RX ORDER — CETIRIZINE HYDROCHLORIDE 10 MG/1
10 TABLET, CHEWABLE ORAL DAILY
Qty: 30 TAB | Refills: 0 | Status: SHIPPED | OUTPATIENT
Start: 2017-10-10

## 2017-10-10 NOTE — ED NOTES
Patient reports that he did not take his BP medication this AM. Patient instructed to take BP medication upon arrival to home and to follow up with his PCP. I have reviewed discharge instructions with the patient. The patient verbalized understanding. Patient left ED via Discharge Method: ambulatory to Home with self. Opportunity for questions and clarification provided. Patient given 3 scripts.  No e-sign

## 2017-10-10 NOTE — ED PROVIDER NOTES
HPI:  78 M, here with left jaw and left ear pain that has been on going for the past 1 month. No fever. No weight loss. Does not have any teeth. Has denture. No headache. Pain worsened at night. No throat pain. Saw PCP and was told no infection to the ear and no antibiotic needed     ROS  Constitutional: No fever, no chills  Skin: no rash  Eye: No vision changes  ENMT: No sore throat, no congestion  Respiratory: No shortness of breath, no cough  Cardiovascular: No chest pain, no palpitations  Gastrointestinal: No vomiting, no nausea, no diarrhea, no constipation, no rectal bleeding, no abdominal pain  : No dysuria, no hematuria  MSK: No back pain, no muscle pain, no joint pain  Neuro: No headache, no change in mental status, no numbness, no tingling, no weakness  Psych: No anxiety, no depression  Endocrine: No hyperglycemia  All other review of systems positive per history of present illness and the above otherwise negative or noncontributory. Visit Vitals    /83 (BP 1 Location: Right arm, BP Patient Position: At rest)    Pulse 66    Temp 97.8 °F (36.6 °C)    Resp 16    Ht 5' 9\" (1.753 m)    Wt 61.2 kg (135 lb)    SpO2 99%    BMI 19.94 kg/m2     Past Medical History:   Diagnosis Date    Chronic kidney disease     CKD (chronic kidney disease)     Hyperlipidemia     Hypertension     PAD (peripheral artery disease) (HCC)     Renal artery stenosis (HCC)     Spinal stenosis     Stroke [facility])      Past Surgical History:   Procedure Laterality Date    COLONOSCOPY N/A 10/5/2017    COLONOSCOPY/21  performed by [facility]ia Connelly [facility] at [facility] ENDOSCOPY    HX HERNIA REPAIR      VASCULAR SURGERY PROCEDURE UNLIST      stents to L femoral, aorto-femoral bypass    VASCULAR SURGERY PROCEDURE UNLIST Right 06/20/2017    fem-iliac thromboembolectomy and fem endarterectomy     Prior to Admission Medications   Prescriptions Last Dose Informant Patient Reported? Taking?    HYDROcodone-acetaminophen ([place]) 5-325 mg per tablet   Yes No   Sig: TK 1 T PO  Q 4 H PRN   amLODIPine (NORVASC) 10 mg tablet   No No   Sig: Take 1 Tab by mouth daily. aspirin delayed-release 81 mg tablet   Yes No   Sig: Take 81 mg by mouth daily. atorvastatin (LIPITOR) 20 mg tablet   No No   Sig: Take 1 Tab by mouth daily. carvedilol (COREG) 25 mg tablet   No No   Sig: Take 1 Tab by mouth two (2) times daily (with meals). hydrALAZINE (APRESOLINE) 25 mg tablet   No No   Sig: Take 2 po TID  Indications: hypertension   hydroCHLOROthiazide (HYDRODIURIL) 25 mg tablet   No No   Sig: Take 1 Tab by mouth daily. potassium chloride (KLOR-CON) 10 mEq tablet   No No   Sig: TAKE 1 TABLET BY MOUTH EVERY DAY   sildenafil citrate (VIAGRA) 50 mg tablet   No No   Sig: Take 1 Tab by mouth as needed. Indications: Erectile Dysfunction      Facility-Administered Medications: None         Adult Exam   General: alert, no acute distress  Head: normocephalic, atraumatic  ENT: moist mucous membranes  Left canal - significant amount of cerumen with impaction. Patient also has tenderness to palpation over the anterior aspect of the left ear.   No signs of mastoiditis  Right ear canal with some minimal wax [place] able to evaluate the TM that appeared normal and unremarkable  Neck: supple, non-tender; full range of motion  Cardiovascular: regular rate and rhythm, normal peripheral perfusion, no edema  Respiratory: lungs are clear to auscultation; normal respirations; no wheezing, rales or rhonchi  Gastrointestinal: soft, non-tender; no rebound or guarding, no peritoneal signs, no distension  Back: non-tender, full range of motion  Musculoskeletal: normal range of motion, normal strength, no gross deformities  Neurological: alert and oriented x 4, no gross focal deficits; normal speech  Psychiatric: cooperative; appropriate mood and affect    MDM: no signs of dental abscess, sinusitis, temporal tenderness, distention that would be consistent with intracranial hemorrhage, sinusitis, temporal arteritis, mastoiditis or tooth infection. This may be secondary to TMJ. However patient  Does not think he grinds his teeth at night. Cerumen disimpaction performed. Patient feels better. On repeat evaluation TM appear cloudy, concerning for purulent drainage. There is minimal irritation to the canal likely secondary to disimpaction. Concern for purulent otitis media. We'll start on Augmentin. We'll also give Flonase, Zyrtec for home. Recommend follow-up with PCP. No neck stiffness. No suspicion for meningitis, intracranial hemorrhage. Cerumen   Procedure Note - Cerumen Removal  Performed by: Cathy Castillo MD  The patients LEFT ear was irrigated with NS and hydrogen peroxide. Cerumen was successfully removed using curette. With significant amount of cerumen removed  The procedure took 10 minutes. The patient tolerated the procedure well. Dragon voice recognition software was used to create this note. Although the note has been reviewed and corrected where necessary, additional errors may have been overlooked and remain in the text.

## 2017-10-10 NOTE — DISCHARGE INSTRUCTIONS
Earwax Blockage: Care Instructions  Your Care Instructions    Earwax is a natural substance that protects the ear canal. Normally, earwax drains from the ears and does not cause problems. Sometimes earwax builds up and hardens. Earwax blockage (also called cerumen impaction) can cause some loss of hearing and pain. When wax is tightly packed, you will need to have your doctor remove it. Follow-up care is a key part of your treatment and safety. Be sure to make and go to all appointments, and call your doctor if you are having problems. Its also a good idea to know your test results and keep a list of the medicines you take. How can you care for yourself at home? · Do not try to remove earwax with cotton swabs, fingers, or other objects. This can make the blockage worse and damage the eardrum. · If your doctor recommends that you try to remove earwax at home:  ¨ Soften and loosen the earwax with warm mineral oil. You also can try hydrogen peroxide mixed with an equal amount of room temperature water. Place 2 drops of the fluid, warmed to body temperature, in the ear two times a day for up to 5 days. ¨ Once the wax is loose and soft, all that is usually needed to remove it from the ear canal is a gentle, warm shower. Direct the water into the ear, then tip your head to let the earwax drain out. Dry your ear thoroughly with a hair dryer set on low. Hold the dryer several inches from your ear. ¨ If the warm mineral oil and shower do not work, use an over-the-counter wax softener followed by gentle flushing with an ear syringe each night for a week or two. Make sure the flushing solution is body temperature. Cool or hot fluids in the ear can cause dizziness. When should you call for help? Call your doctor now or seek immediate medical care if:  · Pus or blood drains from your ear. · Your ears are ringing or feel full. · You have a loss of hearing.   Watch closely for changes in your health, and be sure to contact your doctor if:  · You have pain or reduced hearing after 1 week of home treatment. · You have any new symptoms, such as nausea or balance problems. Where can you learn more? Go to http://teresa-abi.info/. Enter V472 in the search box to learn more about \"Earwax Blockage: Care Instructions. \"  Current as of: March 20, 2017  Content Version: 11.3  © 1732-3639 [employer]. Care instructions adapted under license by [employer] (which disclaims liability or warranty for this information). If you have questions about a medical condition or this instruction, always ask your healthcare professional. [address] any warranty or liability for your use of this information.

## 2018-03-06 ENCOUNTER — HOSPITAL ENCOUNTER (EMERGENCY)
Age: 68
Discharge: OTHER INSTITUTION | End: 2018-03-06
Payer: MEDICARE

## 2018-03-06 ENCOUNTER — APPOINTMENT (OUTPATIENT)
Dept: GENERAL RADIOLOGY | Age: 68
End: 2018-03-06
Payer: MEDICARE

## 2018-03-06 VITALS
HEIGHT: 69 IN | TEMPERATURE: 98 F | HEART RATE: 88 BPM | OXYGEN SATURATION: 98 % | DIASTOLIC BLOOD PRESSURE: 72 MMHG | BODY MASS INDEX: 19.99 KG/M2 | SYSTOLIC BLOOD PRESSURE: 184 MMHG | RESPIRATION RATE: 16 BRPM | WEIGHT: 135 LBS

## 2018-03-06 DIAGNOSIS — R04.1 BLEEDING FROM THROAT: Primary | ICD-10-CM

## 2018-03-06 DIAGNOSIS — C32.9 LARYNGEAL CANCER (HCC): ICD-10-CM

## 2018-03-06 LAB
ALBUMIN SERPL-MCNC: 2.6 G/DL (ref 3.2–4.6)
ALBUMIN/GLOB SERPL: 0.6 {RATIO} (ref 1.2–3.5)
ALP SERPL-CCNC: 60 U/L (ref 50–136)
ALT SERPL-CCNC: 32 U/L (ref 12–65)
ANION GAP SERPL CALC-SCNC: 10 MMOL/L (ref 7–16)
APTT PPP: 29.5 SEC (ref 23.2–35.3)
AST SERPL-CCNC: 28 U/L (ref 15–37)
BASOPHILS # BLD: 0.1 K/UL (ref 0–0.2)
BASOPHILS NFR BLD: 1 % (ref 0–2)
BILIRUB SERPL-MCNC: 0.4 MG/DL (ref 0.2–1.1)
BUN SERPL-MCNC: 26 MG/DL (ref 8–23)
CALCIUM SERPL-MCNC: 9 MG/DL (ref 8.3–10.4)
CHLORIDE SERPL-SCNC: 103 MMOL/L (ref 98–107)
CO2 SERPL-SCNC: 29 MMOL/L (ref 21–32)
CREAT SERPL-MCNC: 1.63 MG/DL (ref 0.8–1.5)
DIFFERENTIAL METHOD BLD: ABNORMAL
EOSINOPHIL # BLD: 0.6 K/UL (ref 0–0.8)
EOSINOPHIL NFR BLD: 5 % (ref 0.5–7.8)
ERYTHROCYTE [DISTWIDTH] IN BLOOD BY AUTOMATED COUNT: 13.6 % (ref 11.9–14.6)
GLOBULIN SER CALC-MCNC: 4.5 G/DL (ref 2.3–3.5)
GLUCOSE SERPL-MCNC: 109 MG/DL (ref 65–100)
HCT VFR BLD AUTO: 29 % (ref 41.1–50.3)
HGB BLD-MCNC: 9.1 G/DL (ref 13.6–17.2)
IMM GRANULOCYTES # BLD: 0 K/UL (ref 0–0.5)
IMM GRANULOCYTES NFR BLD AUTO: 0 % (ref 0–5)
INR PPP: 1.1
LYMPHOCYTES # BLD: 1.8 K/UL (ref 0.5–4.6)
LYMPHOCYTES NFR BLD: 15 % (ref 13–44)
MCH RBC QN AUTO: 27.7 PG (ref 26.1–32.9)
MCHC RBC AUTO-ENTMCNC: 31.4 G/DL (ref 31.4–35)
MCV RBC AUTO: 88.4 FL (ref 79.6–97.8)
MONOCYTES # BLD: 1.4 K/UL (ref 0.1–1.3)
MONOCYTES NFR BLD: 11 % (ref 4–12)
NEUTS SEG # BLD: 8.2 K/UL (ref 1.7–8.2)
NEUTS SEG NFR BLD: 68 % (ref 43–78)
PLATELET # BLD AUTO: 323 K/UL (ref 150–450)
PMV BLD AUTO: 9.4 FL (ref 10.8–14.1)
POTASSIUM SERPL-SCNC: 3.5 MMOL/L (ref 3.5–5.1)
PROT SERPL-MCNC: 7.1 G/DL (ref 6.3–8.2)
PROTHROMBIN TIME: 14.3 SEC (ref 11.5–14.5)
RBC # BLD AUTO: 3.28 M/UL (ref 4.23–5.67)
SODIUM SERPL-SCNC: 142 MMOL/L (ref 136–145)
WBC # BLD AUTO: 12.2 K/UL (ref 4.3–11.1)

## 2018-03-06 PROCEDURE — 71046 X-RAY EXAM CHEST 2 VIEWS: CPT

## 2018-03-06 PROCEDURE — 85730 THROMBOPLASTIN TIME PARTIAL: CPT

## 2018-03-06 PROCEDURE — 80053 COMPREHEN METABOLIC PANEL: CPT

## 2018-03-06 PROCEDURE — 85025 COMPLETE CBC W/AUTO DIFF WBC: CPT

## 2018-03-06 PROCEDURE — 85610 PROTHROMBIN TIME: CPT

## 2018-03-06 PROCEDURE — 70360 X-RAY EXAM OF NECK: CPT

## 2018-03-06 PROCEDURE — 99283 EMERGENCY DEPT VISIT LOW MDM: CPT

## 2018-03-06 NOTE — ED PROVIDER NOTES
HPI Comments: 71-year-old male complaining of cough. Patient's pain worked up for throat cancer at [facility]. Patient recently had a PET scan which showed abnormalities in the throat, stomach and possibly longer. He still awaiting results. No fevers or chills tinged sputum. Patient is a 78 y.o. male presenting with cough. The history is provided by the patient. Cough   This is a recurrent problem. The current episode started 6 to 12 hours ago. The problem occurs constantly. The problem has not changed since onset. The cough is productive of blood-tinged sputum and productive of bloody sputum. There has been no fever. Pertinent negatives include no chest pain, no chills and no sweats. He has tried nothing for the symptoms. Past Medical History:   Diagnosis Date    Cancer [facility])     laryngeal    Chronic kidney disease     CKD (chronic kidney disease)     Hyperlipidemia     Hypertension     PAD (peripheral artery disease) (HCC)     Renal artery stenosis (HCC)     Spinal stenosis     Stroke [facility])        Past Surgical History:   Procedure Laterality Date    COLONOSCOPY N/A 10/5/2017    COLONOSCOPY/21  performed by [facility]varado [facility] at [facility] ENDOSCOPY     [address] UNLIST      stents to L femoral, aorto-femoral bypass    VASCULAR SURGERY PROCEDURE UNLIST Right 06/20/2017    fem-iliac thromboembolectomy and fem endarterectomy         Family History:   Problem Relation Age of Onset    Stroke Father     Hypertension Father     Stroke Mother     Hypertension Mother     Cancer Brother      pancreatic       Social History     Social History    Marital status: SINGLE     Spouse name: N/A    Number of children: N/A    Years of education: N/A     Occupational History    Not on file.      Social History Main Topics    Smoking status: Current Some Day Smoker     Packs/day: 0.25     Years: 40.00     Types: Cigarettes    Smokeless tobacco: Never Used    Alcohol use No  Drug use: No    Sexual activity: Yes     Partners: Female     Birth control/ protection: Condom     Other Topics Concern    Not on file     Social History Narrative         ALLERGIES: Review of patient's allergies indicates no known allergies. Review of Systems   Constitutional: Negative. Negative for activity change and chills. HENT: Negative. Eyes: Negative. Respiratory: Positive for cough. Cardiovascular: Negative. Negative for chest pain. Gastrointestinal: Negative. Genitourinary: Negative. Musculoskeletal: Negative. Skin: Negative. Neurological: Negative. Psychiatric/Behavioral: Negative. All other systems reviewed and are negative. Vitals:    03/06/18 0018   BP: 184/72   Pulse: 88   Resp: 16   Temp: 98 °F (36.7 °C)   SpO2: 98%   Weight: 61.2 kg (135 lb)   Height: 5' 9\" (1.753 m)            Physical Exam   Constitutional: He is oriented to person, place, and time. He appears well-developed and well-nourished. No distress. HENT:   Head: Normocephalic and atraumatic. Right Ear: External ear normal.   Left Ear: External ear normal.   Nose: Nose normal.   Mouth/Throat: Oropharynx is clear and moist. No oropharyngeal exudate. Eyes: Conjunctivae and EOM are normal. Pupils are equal, round, and reactive to light. Right eye exhibits no discharge. Left eye exhibits no discharge. No scleral icterus. Neck: Normal range of motion. Neck supple. No JVD present. No tracheal deviation present. Cardiovascular: Normal rate, regular rhythm and intact distal pulses. Pulmonary/Chest: Effort normal and breath sounds normal. No stridor. No respiratory distress. He has no wheezes. He exhibits no tenderness. Abdominal: Soft. Bowel sounds are normal. He exhibits no distension and no mass. There is no tenderness. Musculoskeletal: Normal range of motion. He exhibits no edema or tenderness. Neurological: He is alert and oriented to person, place, and time.  No cranial nerve deficit. Skin: Skin is warm and dry. No rash noted. He is not diaphoretic. No erythema. No pallor. Psychiatric: He has a normal mood and affect. His behavior is normal. Thought content normal.   Nursing note and vitals reviewed. MDM  Number of Diagnoses or Management Options  Bleeding from throat: established and worsening  Laryngeal cancer [facility]): established and worsening  Diagnosis management comments: Patient has laryngeal cancer and now has bleeding from the throat area most likely due to erosion with the cancer. Cannot see the actual bleeding however the patient does spit up clots of blood estimates it about 2 cups so far. He's been spitting since he has been in the ED mostly clots and approximately half a cup as been seen in the ED. Discussed the patient need for possible ENT procedure and possible admission. The patient feels that he would be best treated by his ENT doctor at [facility] this is where he has had his PET scan and is where his oncologist's as well. He is supposed to be there in the morning which is just 3 hours away.        Amount and/or Complexity of Data Reviewed  Clinical lab tests: ordered and reviewed  Tests in the radiology section of CPT®: ordered and reviewed  Tests in the medicine section of CPT®: ordered and reviewed          ED Course       Procedures

## 2018-03-06 NOTE — ROUTINE PROCESS
Report called to Rachel Renteria RN at [facility]. Pt transported to [facility] via [employer].   # 22 jelco via the left AC intact without redness or swelling
